# Patient Record
Sex: FEMALE | Race: WHITE | NOT HISPANIC OR LATINO | Employment: OTHER
[De-identification: names, ages, dates, MRNs, and addresses within clinical notes are randomized per-mention and may not be internally consistent; named-entity substitution may affect disease eponyms.]

---

## 2017-06-10 ENCOUNTER — HEALTH MAINTENANCE LETTER (OUTPATIENT)
Age: 62
End: 2017-06-10

## 2017-10-12 ENCOUNTER — OFFICE VISIT (OUTPATIENT)
Dept: FAMILY MEDICINE | Facility: CLINIC | Age: 62
End: 2017-10-12

## 2017-10-12 VITALS
SYSTOLIC BLOOD PRESSURE: 100 MMHG | OXYGEN SATURATION: 99 % | TEMPERATURE: 98.2 F | DIASTOLIC BLOOD PRESSURE: 63 MMHG | HEIGHT: 63 IN | WEIGHT: 123 LBS | BODY MASS INDEX: 21.79 KG/M2 | HEART RATE: 73 BPM | RESPIRATION RATE: 16 BRPM

## 2017-10-12 DIAGNOSIS — Z00.00 ROUTINE GENERAL MEDICAL EXAMINATION AT A HEALTH CARE FACILITY: Primary | ICD-10-CM

## 2017-10-12 DIAGNOSIS — Z13.9 SCREENING FOR CONDITION: ICD-10-CM

## 2017-10-12 NOTE — MR AVS SNAPSHOT
After Visit Summary   10/12/2017    Norma Sidhu    MRN: [id number]           Patient Information     Date Of Birth          1955        Visit Information        Provider Department      10/12/2017 2:00 PM Mariela Ji MD [facility]        Today's Diagnoses     Routine general medical examination at a health care facility    -  1    Screening for condition           Follow-ups after your visit        Follow-up notes from your care team     Return in about 1 year (around 10/12/2018).      Who to contact     Please call your clinic at [phone] to:    Ask questions about your health    Make or cancel appointments    Discuss your medicines    Learn about your test results    Speak to your doctor   If you have compliments or concerns about an experience at your clinic, or if you wish to file a complaint, please contact [facility] Patient Relations at [phone] or email us at [email or web address]         Additional Information About Your Visit        MyChart Information     Recargo is an electronic gateway that provides easy, online access to your medical records. With Recargo, you can request a clinic appointment, read your test results, renew a prescription or communicate with your care team.     To sign up for [employer] visit the website at [email or web address]   You will be asked to enter the access code listed below, as well as some personal information. Please follow the directions to create your username and password.     Your access code is: [email or web address]  Expires: 1/10/2018  3:28 PM     Your access code will  in 90 days. If you need help or a new code, please contact your [facility] or call [phone] for assistance.        Care EveryWhere ID     This is your Care EveryWhere ID. This could be used by other organizations to access your [facility] medical records  [id number]    "     Your Vitals Were     Pulse Temperature Respirations Height Pulse Oximetry BMI (Body Mass Index)    73 98.2  F (36.8  C) (Oral) 16 5' 3.19\" (160.5 cm) 99% 21.66 kg/m2       Blood Pressure from Last 3 Encounters:   10/12/17 100/63   06/03/16 96/63   05/02/16 99/62    Weight from Last 3 Encounters:   10/12/17 123 lb (55.8 kg)   06/03/16 139 lb 6.4 oz (63.2 kg)   05/02/16 136 lb 9.6 oz (62 kg)              We Performed the Following     Hepatitis C Antibody ([place])          Today's Medication Changes          These changes are accurate as of: 10/12/17  3:28 PM.  If you have any questions, ask your nurse or doctor.               Stop taking these medicines if you haven't already. Please contact your care team if you have questions.     HALDOL DECANOATE 50 MG/ML Soln injection   Generic drug:  haloperidol decanoate   Stopped by:  Mariela Ji MD                    Primary Care Provider Office Phone # Fax #    Mariela Ji [phone]       [facility] [address] [place] [address]        Equal Access to Services     NIKITA ELI AH: Hadii sandy ku hadasho Soomaali, waaxda luqadaha, qaybta kaalmada adeegyada, andrea deckerin haynicon beryl pino . So [facility] [phone].    ATENCIÓN: Si habla español, tiene a zhu disposición servicios gratuitos de asistencia lingüística. Llame al [phone].    We comply with applicable federal civil rights laws and [place] laws. We do not discriminate on the basis of race, color, national origin, age, disability, sex, sexual orientation, or gender identity.            Thank you!     Thank you for choosing [facility]  for your care. Our goal is always to provide you with excellent care. Hearing back from our patients is one way we can continue to improve our services. Please take a few minutes to complete the written survey that you may receive in the mail after your visit with us. Thank you!             Your Updated " Medication List - Protect others around you: Learn how to safely use, store and throw away your medicines at www.disposemymeds.org.          This list is accurate as of: 10/12/17  3:28 PM.  Always use your most recent med list.                   Brand Name Dispense Instructions for use Diagnosis    Ca Carb-FA-D-B6-B12-Boron-Mg 1342-1 MG Wafr    CALCIUM-FOLIC ACID PLUS D    30 Wafer    Take 1 tablet by mouth daily.    Delusional disorder(297.1)       calcium carb 1250 mg (500 mg [place])/vitamin D 200 units 500-200 MG-UNIT per tablet    OSCAL with D     Take 1 tablet by mouth        LORazepam 0.5 MG tablet    ATIVAN     Take 0.5 mg by mouth every 6 hours as needed    Schizotypal disorder, Schizoaffective disorder, unspecified type (H)       V-C FORTE Caps     30 capsule    Take 1 tablet by mouth daily.    Delusional disorder(297.1)

## 2017-10-12 NOTE — NURSING NOTE
DUE FOR:  Hepatitis C Screening  Colonoscopy/FIT Test  Advance Directive  Mammogram Referral  Pap  Flu   Patient declining all of the above and wants to talk about it with the provider
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
## 2018-05-21 ENCOUNTER — OFFICE VISIT (OUTPATIENT)
Dept: FAMILY MEDICINE | Facility: CLINIC | Age: 63
End: 2018-05-21
Payer: COMMERCIAL

## 2018-05-21 ENCOUNTER — RECORDS - HEALTHEAST (OUTPATIENT)
Dept: ADMINISTRATIVE | Facility: OTHER | Age: 63
End: 2018-05-21

## 2018-05-21 VITALS
HEART RATE: 91 BPM | BODY MASS INDEX: 20.25 KG/M2 | TEMPERATURE: 98.4 F | DIASTOLIC BLOOD PRESSURE: 68 MMHG | WEIGHT: 115 LBS | SYSTOLIC BLOOD PRESSURE: 103 MMHG | OXYGEN SATURATION: 99 %

## 2018-05-21 DIAGNOSIS — R14.1 FLATULENCE, ERUCTATION, AND GAS PAIN: ICD-10-CM

## 2018-05-21 DIAGNOSIS — Z12.39 SCREENING FOR BREAST CANCER: ICD-10-CM

## 2018-05-21 DIAGNOSIS — R14.2 FLATULENCE, ERUCTATION, AND GAS PAIN: ICD-10-CM

## 2018-05-21 DIAGNOSIS — R14.3 FLATULENCE, ERUCTATION, AND GAS PAIN: ICD-10-CM

## 2018-05-21 DIAGNOSIS — K58.2 IRRITABLE BOWEL SYNDROME WITH BOTH CONSTIPATION AND DIARRHEA: Primary | ICD-10-CM

## 2018-05-21 DIAGNOSIS — Z12.11 SCREEN FOR COLON CANCER: ICD-10-CM

## 2018-05-21 NOTE — PATIENT INSTRUCTIONS
Reduce dairy and avoid beans, fried, foods, high fat foods, until you are feeling better.     Fruits and vegetables are ok.   Lean meats are ok.   Small amounts of pasta ok.     Recommend a H Pylori and Colon Cancer FIT test.     =======================================================================  Your medication list is printed, please keep this with you, it is helpful to bring this current list to any other medical appointments, the emergency room or hospital.    If you had lab testing today and your results are reassuring or normal they will be be mailed to you within 7 days.     If the lab tests need quick action we will call you with the results.   The phone number we will call with results is # [phone] (home) . If this is not the best number please call our clinic and change the number.    If you need any refills please call your pharmacy and they will contact us.    If you have any further concerns or wish to schedule another appointment you must call our office during normal business hours  [phone] (8-5:00 M-F)  If you have urgent medical questions that cannot wait  you may also call [phone] at any time of day.  If you have a medical emergency please call [phone].    Thank you for coming to [facility].

## 2018-05-21 NOTE — PROGRESS NOTES
"Subjective: Norma is a 62-year-old female who I am meeting for the first time today.  She is an established patient of my partner Dr. Ji.  For the past 2 months she describes on-and-off bouts of abdominal gas.  She denies any pain now.  She recalls maybe she had some discomfort with gas at some point in the past two months.  Her chief concern is that she has more gas than usual, and it is embarrassing.  She also has times where she will have frequent loose stools and times with constipation.  She had 3 of constipation last week and 2 days of loose stools prompting her to schedule this visit.  She had a normal stool yesterday and has not had a stool today.  She has tried Mylanta once over the weekend which did not seem to make a difference.  She has had some similar symptoms in the past which she calls \"irritable bowel\".  She describes having a significant episode of \"gastritis\" when she was 18.  Denies any history of ulcers, bleeding, or inflammatory bowel disease.  She did some research online and is concerned she has H pylori induced gastritis.  She would like to be tested for H. Pylori.  She is also concerned that this could be due to stress.    Review of systems: No abdominal pain, no vomiting, no heartburn, no weight loss, no fevers or chills.  No blood in her stools or black stools. No vaginal bleeding. Post menopausal.  Frequently gets constipation when she travels to [place].    Social history: She is .  Describes her interactions with others as stressful.  \"People are mean\"  Frequently eats \"fruit smoothies\", tries to eat a healthy diet  Non-smoker    Exam  Vitals are as above with blood pressure goal  She weighs 115 pounds  General: Somewhat anxious appearing relates her own history.  Thin.  HEENT: Head is normocephalic and atraumatic eyes sclera nonicteric noninjected neck is free of adenopathy  Cardiovascular: Regular rate and rhythm  Respiratory: Clear bilaterally  Abdomen: Soft and " nontender throughout.  No masses.  No epigastric tenderness, no areas of tenderness, no abdominal distention  Extremities: Free of edema    Assessment and plan  1.  Abdominal gas, intermittent constipation and diarrhea: Differential diagnosis is broad at this point.  Exam is reassuring.  An irritable bowel syndrome seems most likely.  I did not identify acute psychological changes, but we did discuss that stress could certainly contribute.  Trial of a daily fiber supplement and avoiding foods which may increase gas and abdominal discomfort.  She was given instructions on the after visit summary.  It is reasonable to check stool H. pylori antigen which was ordered.   Also recommended colon cancer screening and she consented to fit test.  Deferred imaging,  exam,  or referral to endoscopy pending conservative observation and management over the coming days.  She will follow-up next Wednesday with her primary physician as scheduled

## 2018-05-21 NOTE — MR AVS SNAPSHOT
After Visit Summary   5/21/2018    Norma Sidhu    MRN: [id number]           Patient Information     Date Of Birth          1955        Visit Information        Provider Department      5/21/2018 11:00 AM Levy Madsen MD [facility]        Today's Diagnoses     Screening for breast cancer    -  1    Screen for colon cancer          Care Instructions    Reduce dairy and avoid beans, fried, foods, high fat foods, until you are feeling better.     Fruits and vegetables are ok.   Lean meats are ok.   Small amounts of pasta ok.     Recommend a H Pylori and Colon Cancer FIT test.     =======================================================================  Your medication list is printed, please keep this with you, it is helpful to bring this current list to any other medical appointments, the emergency room or hospital.    If you had lab testing today and your results are reassuring or normal they will be be mailed to you within 7 days.     If the lab tests need quick action we will call you with the results.   The phone number we will call with results is # [phone] (home) . If this is not the best number please call our clinic and change the number.    If you need any refills please call your pharmacy and they will contact us.    If you have any further concerns or wish to schedule another appointment you must call our office during normal business hours  [phone] (8-5:00 M-F)  If you have urgent medical questions that cannot wait  you may also call [phone] at any time of day.  If you have a medical emergency please call [phone].    Thank you for coming to [facility].              Follow-ups after your visit        Your next 10 appointments already scheduled     May 30, 2018  4:00 PM CDT   Return Visit with Mariela Ji MD   [facility] ([facility])    [address] [place] [address]   [phone]              Future  tests that were ordered for you today     Open Future Orders        Priority Expected Expires Ordered    Fecal Occult Blood - FIT, iFOB (P FM) Routine  2019            Who to contact     Please call your clinic at [phone] to:    Ask questions about your health    Make or cancel appointments    Discuss your medicines    Learn about your test results    Speak to your doctor            Additional Information About Your Visit        MyChart Information     Messagemind is an electronic gateway that provides easy, online access to your medical records. With Messagemind, you can request a clinic appointment, read your test results, renew a prescription or communicate with your care team.     To sign up for Messagemind visit the website at [email or web address]   You will be asked to enter the access code listed below, as well as some personal information. Please follow the directions to create your username and password.     Your access code is: [email or web address]  Expires: 2018 11:36 AM     Your access code will  in 90 days. If you need help or a new code, please contact your [facility] or call [phone] for assistance.        Care EveryWhere ID     This is your Care EveryWhere ID. This could be used by other organizations to access your [facility] medical records  [id number]        Your Vitals Were     Pulse Temperature Pulse Oximetry BMI (Body Mass Index)          91 98.4  F (36.9  C) (Oral) 99% 20.25 kg/m2         Blood Pressure from Last 3 Encounters:   18 103/68   10/12/17 100/63   16 96/63    Weight from Last 3 Encounters:   18 115 lb (52.2 kg)   10/12/17 123 lb (55.8 kg)   16 139 lb 6.4 oz (63.2 kg)              We Performed the Following     MA SCREENING DIGITAL BILAT        Primary Care Provider Office Phone # Fax #    Mariela Ji [phone]       [facility] [address]        Equal Access to  Services     [facility]: Hadii aad ku hadottojaun Priyankaabdulkadir, wajeanneda luqadaha, qaybta kaalmarosa martino, andrea pino . So [facility] [phone].    ATENCIÓN: Si habla erum, tiene a zhu disposición servicios gratuitos de asistencia lingüística. Llame al [phone].    We comply with applicable federal civil rights laws and [place] laws. We do not discriminate on the basis of race, color, national origin, age, disability, sex, sexual orientation, or gender identity.            Thank you!     Thank you for choosing [facility]  for your care. Our goal is always to provide you with excellent care. Hearing back from our patients is one way we can continue to improve our services. Please take a few minutes to complete the written survey that you may receive in the mail after your visit with us. Thank you!             Your Updated Medication List - Protect others around you: Learn how to safely use, store and throw away your medicines at www.disposemymeds.org.          This list is accurate as of 5/21/18 11:37 AM.  Always use your most recent med list.                   Brand Name Dispense Instructions for use Diagnosis    Ca Carb-FA-D-B6-B12-Boron-Mg 1342-1 MG Wafr    CALCIUM-FOLIC ACID PLUS D    30 Wafer    Take 1 tablet by mouth daily.    Delusional disorder(297.1)       Calcium carb-Vitamin D 500 mg [place]-200 units 500-200 MG-UNIT per tablet    OSCAL with D;Oyster Shell Calcium     Take 1 tablet by mouth        LORazepam 0.5 MG tablet    ATIVAN     Take 0.5 mg by mouth every 6 hours as needed    Schizotypal disorder, Schizoaffective disorder, unspecified type (H)       V-C FORTE Caps     30 capsule    Take 1 tablet by mouth daily.    Delusional disorder(297.1)

## 2018-09-21 ENCOUNTER — OFFICE VISIT (OUTPATIENT)
Dept: FAMILY MEDICINE | Facility: CLINIC | Age: 63
End: 2018-09-21
Payer: COMMERCIAL

## 2018-09-21 VITALS
TEMPERATURE: 97.9 F | RESPIRATION RATE: 18 BRPM | HEIGHT: 63 IN | DIASTOLIC BLOOD PRESSURE: 70 MMHG | SYSTOLIC BLOOD PRESSURE: 103 MMHG | BODY MASS INDEX: 19.31 KG/M2 | WEIGHT: 109 LBS | HEART RATE: 95 BPM

## 2018-09-21 DIAGNOSIS — T74.91XS DOMESTIC VIOLENCE OF ADULT, SEQUELA: Primary | ICD-10-CM

## 2018-09-21 DIAGNOSIS — F20.3 UNDIFFERENTIATED SCHIZOPHRENIA (H): ICD-10-CM

## 2018-09-21 DIAGNOSIS — R14.1 FLATULENCE, ERUCTATION, AND GAS PAIN: ICD-10-CM

## 2018-09-21 DIAGNOSIS — R14.3 FLATULENCE, ERUCTATION, AND GAS PAIN: ICD-10-CM

## 2018-09-21 DIAGNOSIS — R14.2 FLATULENCE, ERUCTATION, AND GAS PAIN: ICD-10-CM

## 2018-09-21 PROBLEM — T74.91XA DOMESTIC ABUSE OF ADULT: Status: ACTIVE | Noted: 2018-09-21
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
## 2020-04-11 ENCOUNTER — MEDICAL CORRESPONDENCE (OUTPATIENT)
Dept: HEALTH INFORMATION MANAGEMENT | Facility: CLINIC | Age: 65
End: 2020-04-11

## 2020-10-14 ENCOUNTER — TELEPHONE (OUTPATIENT)
Dept: FAMILY MEDICINE | Facility: CLINIC | Age: 65
End: 2020-10-14

## 2020-10-14 NOTE — TELEPHONE ENCOUNTER
Called patient to discuss. Left detailed VM on identified VM advising will need to see an alternate provider r/t needing to be face-to-face visit. Offered to see a different faculty provider or one of the physicians on 's colored team. Requested call-back to schedule. Lydia MALHOTRA

## 2020-10-14 NOTE — TELEPHONE ENCOUNTER
[facility] phone call message- general phone call:    Reason for call: Patient called wanting an appointment with Dr. Ji or Dr. Anthony for a Biopsy on a possible melanoma on her nose. Patient is to have plastic surgery in 2 weeks and the surgeon would like for the nose to be looked at first if possible. Advised to patient Dr. Ji has no opening until November and Dr. Anthony only works from home. Patient declined visit with other providers at this time and request for a nurse to return her call. Please call and advise.     Return call needed: Yes    OK to leave a message on voice mail? Yes    Primary language: English      needed? No    Call taken on October 14, 2020 at 9:42 AM by Catarino Strong

## 2020-11-20 ENCOUNTER — TELEPHONE (OUTPATIENT)
Dept: DERMATOLOGY | Facility: CLINIC | Age: 65
End: 2020-11-20

## 2020-11-20 NOTE — TELEPHONE ENCOUNTER
Reason for call:  Other   Patient called regarding (reason for call): appointment  Additional comments: Please call pt to schedule appointment    Phone number to reach patient:  Home number on file [phone] (home)    Best Time:  any    Can we leave a detailed message on this number?  YES    Travel screening: Not Applicable

## 2020-11-25 ENCOUNTER — HOSPITAL ENCOUNTER (OUTPATIENT)
Dept: MAMMOGRAPHY | Facility: CLINIC | Age: 65
Discharge: HOME | End: 2020-11-25
Attending: FAMILY MEDICINE

## 2020-11-25 DIAGNOSIS — Z12.31 VISIT FOR SCREENING MAMMOGRAM: ICD-10-CM

## 2020-11-30 ENCOUNTER — TELEPHONE (OUTPATIENT)
Dept: DERMATOLOGY | Facility: CLINIC | Age: 65
End: 2020-11-30

## 2020-11-30 ENCOUNTER — TRANSFERRED RECORDS (OUTPATIENT)
Dept: MULTI SPECIALTY CLINIC | Facility: CLINIC | Age: 65
End: 2020-11-30

## 2020-11-30 ENCOUNTER — OFFICE VISIT (OUTPATIENT)
Dept: DERMATOLOGY | Facility: CLINIC | Age: 65
End: 2020-11-30
Payer: MEDICARE

## 2020-11-30 VITALS — RESPIRATION RATE: 16 BRPM | SYSTOLIC BLOOD PRESSURE: 99 MMHG | HEART RATE: 79 BPM | DIASTOLIC BLOOD PRESSURE: 66 MMHG

## 2020-11-30 DIAGNOSIS — L57.0 AK (ACTINIC KERATOSIS): Primary | ICD-10-CM

## 2020-11-30 LAB — PAP SMEAR - HIM PATIENT REPORTED: NORMAL

## 2020-11-30 PROCEDURE — 99203 OFFICE O/P NEW LOW 30 MIN: CPT | Mod: 25 | Performed by: DERMATOLOGY

## 2020-11-30 PROCEDURE — 11102 TANGNTL BX SKIN SINGLE LES: CPT | Performed by: DERMATOLOGY

## 2020-11-30 PROCEDURE — 88331 PATH CONSLTJ SURG 1 BLK 1SPC: CPT | Performed by: DERMATOLOGY

## 2020-11-30 NOTE — TELEPHONE ENCOUNTER
----- Message from Michael Parisi MD sent at 11/30/2020 11:09 AM CST -----  Nasal tip actinic keratosis     I would allow things to heal and return to clinic in 3 months for recheck

## 2020-11-30 NOTE — NURSING NOTE
"Initial BP 99/66 (BP Location: Left arm, Patient Position: Sitting, Cuff Size: Adult Regular)   Pulse 79   Resp 16  Estimated body mass index is 19.31 kg/m  as calculated from the following:    Height as of 9/21/18: 1.6 m (5' 2.99\").    Weight as of 9/21/18: 49.4 kg (109 lb). .    Candy Ramírez [facility]    "

## 2020-11-30 NOTE — PROGRESS NOTES
Norma Sidhu is an extremely pleasant 65 year old year old female patient here today for spot on nose.   .  Patient states this has been present for sometime.  Patient reports the following symptoms:  crusting.  Patient reports the following previous treatments was given efudex and had some bleeding.  .  These treatments did not work.  She used for one week.  Stopped about 3 days ago.  Patient has no other skin complaints today.  Remainder of the HPI, Meds, PMH, Allergies, FH, and SH was reviewed in chart.      Past Medical History:   Diagnosis Date     Difficulty with family 1/30/2013     Problem list name updated by automated process. Provider to review     Hypotension      Insomnia     hx of     Postmenopausal atrophic vaginitis 1/30/2013     Psychosis (H) 2/27/2015     Undifferentiated schizophrenia (H) 7/20/2015       Past Surgical History:   Procedure Laterality Date     CL AFF SURGICAL PATHOLOGY  2000    melanoma resection left leg     hospitalzation  7-19 thru 9-2-15    [facility]        Family History   Problem Relation Age of Onset     Alzheimer Disease Mother      Hyperlipidemia Father      Coronary Artery Disease Paternal Grandfather         in his 60s     Diabetes No family hx of      Cancer - colorectal No family hx of      Breast Cancer No family hx of      Hypertension No family hx of      Cerebrovascular Disease No family hx of      Colon Cancer No family hx of      Prostate Cancer No family hx of      Other Cancer No family hx of      Depression No family hx of      Anxiety Disorder No family hx of      Mental Illness No family hx of      Substance Abuse No family hx of      Anesthesia Reaction No family hx of      Asthma No family hx of      Osteoporosis No family hx of      Genetic Disorder No family hx of      Thyroid Disease No family hx of      Obesity No family hx of        Social History     Socioeconomic History     Marital status:      Spouse name:       Number of children: 2     Years of education: Not on file     Highest education level: Not on file   Occupational History     Occupation: Retired     Occupation: Homemaker     Occupation: Unemployed   Social Needs     Financial resource strain: Not on file     Food insecurity     Worry: Not on file     Inability: Not on file     Transportation needs     Medical: Not on file     Non-medical: Not on file   Tobacco Use     Smoking status: Never Smoker     Smokeless tobacco: Never Used   Substance and Sexual Activity     Alcohol use: No     Alcohol/week: 0.0 standard drinks     Drug use: No     Sexual activity: Yes     Partners: Male   Lifestyle     Physical activity     Days per week: Not on file     Minutes per session: Not on file     Stress: Not on file   Relationships     Social connections     Talks on phone: Not on file     Gets together: Not on file     Attends Advent service: Not on file     Active member of club or organization: Not on file     Attends meetings of clubs or organizations: Not on file     Relationship status: Not on file     Intimate partner violence     Fear of current or ex partner: Not on file     Emotionally abused: Not on file     Physically abused: Not on file     Forced sexual activity: Not on file   Other Topics Concern     Parent/sibling w/ CABG, MI or angioplasty before 65F 55M? Not Asked   Social History Narrative    Lives with .  Both with psychiatric conditions        Children: Ming Taylor (adults)       Outpatient Encounter Medications as of 11/30/2020   Medication Sig Dispense Refill     calcium carb 1250 mg, 500 mg [place],/vitamin D 200 units (CALCIUM CARB 1250 MG, 500 MG [place],/VITAMIN D 200 UNIT) 500-200 MG-UNIT per tablet Take 1 tablet by mouth       LORazepam (ATIVAN) 0.5 MG tablet Take 0.5 mg by mouth every 6 hours as needed       Multiple Vitamins-Minerals (V-C FORTE) CAPS Take 1 tablet by mouth daily. 30 capsule 2     Ca Carb-FA-D-B6-B12-Boron-Mg  (CALCIUM-FOLIC ACID PLUS D) 1342-1 MG WAFR Take 1 tablet by mouth daily. (Patient not taking: Reported on 5/21/2018) 30 Wafer 2     No facility-administered encounter medications on file as of 11/30/2020.              Review Of Systems  Skin: As above  Eyes: negative  Ears/Nose/Throat: negative  Respiratory: No shortness of breath, dyspnea on exertion, cough, or hemoptysis  Cardiovascular: negative  Gastrointestinal: negative  Genitourinary: negative  Musculoskeletal: negative  Neurologic: negative  Psychiatric: negative  Hematologic/Lymphatic/Immunologic: negative  Endocrine: negative      O:   NAD, WDWN, Alert & Oriented, Mood & Affect wnl, Vitals stable   Here today alone   BP 99/66 (BP Location: Left arm, Patient Position: Sitting, Cuff Size: Adult Regular)   Pulse 79   Resp 16    General appearance normal   Vitals stable   Alert, oriented and in no acute distress     L nasal supra tip crusted 5mm scaly papule       Eyes: Conjunctivae/lids:Normal     ENT: Lips, buccal mucosa, tongue: normal    MSK:Normal    Cardiovascular: peripheral edema none    Pulm: Breathing Normal    Neuro/Psych: Orientation:Alert and Orientedx3 ; Mood/Affect:normal       MICRO:   L nasal suprtip:There is hyperkeratosis and focal parakeratosis of the epidermis, and ucleration of epidermis, overlying atypical keratinocytes,  by areas of orthokeratosis, there are scattered basal atypical keratinocytes: with varying degrees of overlying loss of maturation, hyperchromatism, pleomorphism, increased and abnormal mitoses, dyskeratosis.  The dermis shows a variable inflammatory infiltrate.   A/P:  1. Nasal tip r/o squamous cell carcinoma   TANGENTIAL BIOPSY IN HOUSE:  After consent, anesthesia with LEC and prep, tangential excision performed and dx above confirmed with frozen section histology.  No complications and routine wound care.      I have personally reviewed all specimens and/or slides and used them with my medical judgement to  determine or confirm the final diagnosis.     Patient told result actinic keratosis    I would wait for things to heal and return to clinic in 3month for recheck     It was a pleasure speaking to Norma Sidhu today.  UV precautions reviewed with patient.

## 2020-11-30 NOTE — LETTER
11/30/2020         RE: Norma Sidhu  [address] [facility] [address]        Dear Colleague,    Thank you for referring your patient, Norma Sidhu, to the [facility]. Please see a copy of my visit note below.    Norma Sidhu is an extremely pleasant 65 year old year old female patient here today for spot on nose.   .  Patient states this has been present for sometime.  Patient reports the following symptoms:  crusting.  Patient reports the following previous treatments was given efudex and had some bleeding.  .  These treatments did not work.  She used for one week.  Stopped about 3 days ago.  Patient has no other skin complaints today.  Remainder of the HPI, Meds, PMH, Allergies, FH, and SH was reviewed in chart.      Past Medical History:   Diagnosis Date     Difficulty with family 1/30/2013     Problem list name updated by automated process. Provider to review     Hypotension      Insomnia     hx of     Postmenopausal atrophic vaginitis 1/30/2013     Psychosis (H) 2/27/2015     Undifferentiated schizophrenia (H) 7/20/2015       Past Surgical History:   Procedure Laterality Date     CL AFF SURGICAL PATHOLOGY  2000    melanoma resection left leg     hospitalzation  7-19 thru 9-2-15    [facility]        Family History   Problem Relation Age of Onset     Alzheimer Disease Mother      Hyperlipidemia Father      Coronary Artery Disease Paternal Grandfather         in his 60s     Diabetes No family hx of      Cancer - colorectal No family hx of      Breast Cancer No family hx of      Hypertension No family hx of      Cerebrovascular Disease No family hx of      Colon Cancer No family hx of      Prostate Cancer No family hx of      Other Cancer No family hx of      Depression No family hx of      Anxiety Disorder No family hx of      Mental Illness No family hx of      Substance Abuse No family hx of      Anesthesia Reaction No  family hx of      Asthma No family hx of      Osteoporosis No family hx of      Genetic Disorder No family hx of      Thyroid Disease No family hx of      Obesity No family hx of        Social History     Socioeconomic History     Marital status:      Spouse name:      Number of children: 2     Years of education: Not on file     Highest education level: Not on file   Occupational History     Occupation: Retired     Occupation: Homemaker     Occupation: Unemployed   Social Needs     Financial resource strain: Not on file     Food insecurity     Worry: Not on file     Inability: Not on file     Transportation needs     Medical: Not on file     Non-medical: Not on file   Tobacco Use     Smoking status: Never Smoker     Smokeless tobacco: Never Used   Substance and Sexual Activity     Alcohol use: No     Alcohol/week: 0.0 standard drinks     Drug use: No     Sexual activity: Yes     Partners: Male   Lifestyle     Physical activity     Days per week: Not on file     Minutes per session: Not on file     Stress: Not on file   Relationships     Social connections     Talks on phone: Not on file     Gets together: Not on file     Attends Rastafarian service: Not on file     Active member of club or organization: Not on file     Attends meetings of clubs or organizations: Not on file     Relationship status: Not on file     Intimate partner violence     Fear of current or ex partner: Not on file     Emotionally abused: Not on file     Physically abused: Not on file     Forced sexual activity: Not on file   Other Topics Concern     Parent/sibling w/ CABG, MI or angioplasty before 65F 55M? Not Asked   Social History Narrative    Lives with .  Both with psychiatric conditions        Children: Ming Taylor (adults)       Outpatient Encounter Medications as of 11/30/2020   Medication Sig Dispense Refill     calcium carb 1250 mg, 500 mg [place],/vitamin D 200 units (CALCIUM CARB 1250 MG, 500 MG [place],/VITAMIN D  200 UNIT) 500-200 MG-UNIT per tablet Take 1 tablet by mouth       LORazepam (ATIVAN) 0.5 MG tablet Take 0.5 mg by mouth every 6 hours as needed       Multiple Vitamins-Minerals (V-C FORTE) CAPS Take 1 tablet by mouth daily. 30 capsule 2     Ca Carb-FA-D-B6-B12-Boron-Mg (CALCIUM-FOLIC ACID PLUS D) 1342-1 MG WAFR Take 1 tablet by mouth daily. (Patient not taking: Reported on 5/21/2018) 30 Wafer 2     No facility-administered encounter medications on file as of 11/30/2020.              Review Of Systems  Skin: As above  Eyes: negative  Ears/Nose/Throat: negative  Respiratory: No shortness of breath, dyspnea on exertion, cough, or hemoptysis  Cardiovascular: negative  Gastrointestinal: negative  Genitourinary: negative  Musculoskeletal: negative  Neurologic: negative  Psychiatric: negative  Hematologic/Lymphatic/Immunologic: negative  Endocrine: negative      O:   NAD, WDWN, Alert & Oriented, Mood & Affect wnl, Vitals stable   Here today alone   BP 99/66 (BP Location: Left arm, Patient Position: Sitting, Cuff Size: Adult Regular)   Pulse 79   Resp 16    General appearance normal   Vitals stable   Alert, oriented and in no acute distress     L nasal supra tip crusted 5mm scaly papule       Eyes: Conjunctivae/lids:Normal     ENT: Lips, buccal mucosa, tongue: normal    MSK:Normal    Cardiovascular: peripheral edema none    Pulm: Breathing Normal    Neuro/Psych: Orientation:Alert and Orientedx3 ; Mood/Affect:normal       MICRO:   L nasal suprtip:There is hyperkeratosis and focal parakeratosis of the epidermis, and ucleration of epidermis, overlying atypical keratinocytes,  by areas of orthokeratosis, there are scattered basal atypical keratinocytes: with varying degrees of overlying loss of maturation, hyperchromatism, pleomorphism, increased and abnormal mitoses, dyskeratosis.  The dermis shows a variable inflammatory infiltrate.   A/P:  1. Nasal tip r/o squamous cell carcinoma   TANGENTIAL BIOPSY IN HOUSE:   After consent, anesthesia with LEC and prep, tangential excision performed and dx above confirmed with frozen section histology.  No complications and routine wound care.      I have personally reviewed all specimens and/or slides and used them with my medical judgement to determine or confirm the final diagnosis.     Patient told result actinic keratosis    I would wait for things to heal and return to clinic in 3month for recheck     It was a pleasure speaking to Norma Sidhu today.  UV precautions reviewed with patient.        Again, thank you for allowing me to participate in the care of your patient.        Sincerely,        Michael Parisi MD

## 2020-12-01 NOTE — TELEPHONE ENCOUNTER
Patient notified. Patient verbalized understanding. Transferred to schedule 3 month follow up appt. Gisela Noel RN

## 2021-01-06 ENCOUNTER — TELEPHONE (OUTPATIENT)
Dept: OBGYN | Facility: CLINIC | Age: 66
End: 2021-01-06

## 2021-05-28 ENCOUNTER — RECORDS - HEALTHEAST (OUTPATIENT)
Dept: ADMINISTRATIVE | Facility: CLINIC | Age: 66
End: 2021-05-28

## 2021-05-30 ENCOUNTER — RECORDS - HEALTHEAST (OUTPATIENT)
Dept: ADMINISTRATIVE | Facility: CLINIC | Age: 66
End: 2021-05-30

## 2021-05-31 ENCOUNTER — RECORDS - HEALTHEAST (OUTPATIENT)
Dept: ADMINISTRATIVE | Facility: CLINIC | Age: 66
End: 2021-05-31

## 2021-06-30 ENCOUNTER — TELEPHONE (OUTPATIENT)
Dept: FAMILY MEDICINE | Facility: CLINIC | Age: 66
End: 2021-06-30

## 2021-06-30 PROBLEM — G47.00 INSOMNIA: Status: ACTIVE | Noted: 2021-06-30

## 2021-06-30 PROBLEM — C43.70 MALIGNANT MELANOMA OF SKIN OF LOWER LIMB, INCLUDING HIP (H): Status: ACTIVE | Noted: 2021-06-30

## 2021-06-30 NOTE — TELEPHONE ENCOUNTER
[facility] phone call message- patient requesting results:    Test: Lab and Mammogram    Date of test: Unsure    Additional Comments: Patient states she had a mammogram sometime last year and never got her results. Patient wants to know what her results are. She states she also has some questions about the covid19 vaccine. Please call and advise.     OK to leave a message on voice mail? Yes    Primary language: English      needed? No    Call taken on June 30, 2021 at 9:53 AM by Susana Cherry
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
## 2021-07-08 ENCOUNTER — TRANSFERRED RECORDS (OUTPATIENT)
Dept: HEALTH INFORMATION MANAGEMENT | Facility: CLINIC | Age: 66
End: 2021-07-08

## 2021-07-08 LAB
HPV ABSTRACT: NORMAL
PAP-ABSTRACT: NORMAL

## 2021-07-13 ENCOUNTER — RECORDS - HEALTHEAST (OUTPATIENT)
Dept: ADMINISTRATIVE | Facility: CLINIC | Age: 66
End: 2021-07-13

## 2021-07-21 ENCOUNTER — RECORDS - HEALTHEAST (OUTPATIENT)
Dept: ADMINISTRATIVE | Facility: CLINIC | Age: 66
End: 2021-07-21

## 2021-07-21 ENCOUNTER — TELEPHONE (OUTPATIENT)
Dept: FAMILY MEDICINE | Facility: CLINIC | Age: 66
End: 2021-07-21

## 2021-07-21 NOTE — TELEPHONE ENCOUNTER
Sores in her genital area. Recently got tested for AIDS at [facility] and tested negative and also got a papsmear for HPV and tested positive. Patient wants to know why she tested negative for one and positive for the other.  Please call back and advise.

## 2021-07-21 NOTE — TELEPHONE ENCOUNTER
Called Norma to further discuss. Provided education on the difference between HPV and HIV. Unable to see results at this time, provided general education on cinthya HPV, sores from HPV and using protection during intercourse. Norma asked if we can tell when she contracted HPV, advised unable to determine when. Advised to speak with OBGYN for other questions at her upcoming appointment due to unable to see results. Norma verbalized understanding.

## 2021-07-24 ENCOUNTER — HOSPITAL ENCOUNTER (EMERGENCY)
Facility: CLINIC | Age: 66
Discharge: HOME | End: 2021-07-24
Attending: PHYSICIAN ASSISTANT | Admitting: PHYSICIAN ASSISTANT
Payer: MEDICARE

## 2021-07-24 VITALS
HEART RATE: 64 BPM | OXYGEN SATURATION: 98 % | RESPIRATION RATE: 14 BRPM | DIASTOLIC BLOOD PRESSURE: 68 MMHG | BODY MASS INDEX: 19.49 KG/M2 | SYSTOLIC BLOOD PRESSURE: 100 MMHG | WEIGHT: 110 LBS | TEMPERATURE: 98 F

## 2021-07-24 DIAGNOSIS — B96.89 BACTERIAL VAGINOSIS: ICD-10-CM

## 2021-07-24 DIAGNOSIS — A60.00 GENITAL HERPES: ICD-10-CM

## 2021-07-24 DIAGNOSIS — N76.0 BACTERIAL VAGINOSIS: ICD-10-CM

## 2021-07-24 DIAGNOSIS — Z59.00 HOMELESSNESS: ICD-10-CM

## 2021-07-24 DIAGNOSIS — N90.89 LESION OF VULVA: ICD-10-CM

## 2021-07-24 LAB
CLUE CELLS: PRESENT
TRICHOMONAS, WET PREP: ABNORMAL
WBC'S/HIGH POWER FIELD, WET PREP: ABNORMAL
YEAST, WET PREP: ABNORMAL

## 2021-07-24 PROCEDURE — 99214 OFFICE O/P EST MOD 30 MIN: CPT | Performed by: PHYSICIAN ASSISTANT

## 2021-07-24 PROCEDURE — 87529 HSV DNA AMP PROBE: CPT | Mod: 91

## 2021-07-24 PROCEDURE — 87529 HSV DNA AMP PROBE: CPT | Performed by: PHYSICIAN ASSISTANT

## 2021-07-24 PROCEDURE — G0463 HOSPITAL OUTPT CLINIC VISIT: HCPCS | Performed by: PHYSICIAN ASSISTANT

## 2021-07-24 PROCEDURE — 87210 SMEAR WET MOUNT SALINE/INK: CPT | Performed by: PHYSICIAN ASSISTANT

## 2021-07-24 RX ORDER — VALACYCLOVIR HYDROCHLORIDE 1 G/1
1000 TABLET, FILM COATED ORAL 2 TIMES DAILY
Qty: 20 TABLET | Refills: 0 | Status: SHIPPED | OUTPATIENT
Start: 2021-07-24 | End: 2021-11-27

## 2021-07-24 RX ORDER — METRONIDAZOLE 500 MG/1
500 TABLET ORAL 2 TIMES DAILY
Qty: 14 TABLET | Refills: 0 | Status: SHIPPED | OUTPATIENT
Start: 2021-07-24 | End: 2021-07-31

## 2021-07-24 SDOH — ECONOMIC STABILITY - HOUSING INSECURITY: HOMELESSNESS UNSPECIFIED: Z59.00

## 2021-07-24 ASSESSMENT — ENCOUNTER SYMPTOMS
HEMATURIA: 0
FLANK PAIN: 0
PSYCHIATRIC NEGATIVE: 1
FREQUENCY: 0
CARDIOVASCULAR NEGATIVE: 1
NEUROLOGICAL NEGATIVE: 1
EYES NEGATIVE: 1
DIFFICULTY URINATING: 0
MUSCULOSKELETAL NEGATIVE: 1
CONSTITUTIONAL NEGATIVE: 1
DYSURIA: 0
RESPIRATORY NEGATIVE: 1
GASTROINTESTINAL NEGATIVE: 1

## 2021-07-24 NOTE — DISCHARGE INSTRUCTIONS
Use medication as directed.   Valtrex for herpes simplex outbreak and flagyl for bacterial vaginosis. No alcohol while taking flagyl it will cause extreme vomiting.     Keep your appointment with your primary care provider for further evaluation and management of sores and for full physical.     We called the [place] women[facility] for you today and they are open until 4:30pm. We gave them your phone number also. Please also see attached information for other women's [facility] and resources.     Return to the Emergency Room if worsening/change in symptoms, fevers, concern for your safety, thoughts of hurting yourself or others.
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
## 2021-07-25 ENCOUNTER — TELEPHONE (OUTPATIENT)
Dept: EMERGENCY MEDICINE | Facility: CLINIC | Age: 66
End: 2021-07-25

## 2021-07-25 LAB
HSV1 DNA SPEC QL NAA+PROBE: NOT DETECTED
HSV2 DNA SPEC QL NAA+PROBE: DETECTED

## 2021-07-25 NOTE — RESULT ENCOUNTER NOTE
At 11A Left voicemail message requesting a call back to [facility] ED Lab Result RN at [phone].  RN is available every day between 9 a.m. and 5:30 p.m.  See telephone encounter
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
## 2021-07-25 NOTE — RESULT ENCOUNTER NOTE
Final result for Herpes Simplex Virus 1 PCR is [NEGATIVE] and Herpes Simplex Virus 2 PCR is [POSITIVE].    [facility] Emergency Dept discharge antiviral medication (if prescribed): Valacyclovir (VALTREX) 1000 mg tablet, 1 tablet (1,000 mg) by mouth 2 times daily for 10 days  Recommendations in Treatment per [facility] ED Lab Result Herpes Simplex Virus Protocol

## 2021-07-25 NOTE — TELEPHONE ENCOUNTER
[facility] result notification     Patient/parent Name  Norma    Lab result (if applicable):  Final result for Herpes Simplex Virus 1 PCR is [NEGATIVE] and Herpes Simplex Virus 2 PCR is [POSITIVE].    [facility] Emergency Dept discharge antiviral medication (if prescribed): Valacyclovir (VALTREX) 1000 mg tablet, 1 tablet (1,000 mg) by mouth 2 times daily for 10 days  Recommendations in Treatment per [facility] ED Lab Result Herpes Simplex Virus Protocol    RN Recommendations/Instructions per [place] ED lab result protocol  Patient notified of lab result and treatment recommendations.  Encouraged to take the Valtrex as prescribed.  She has appt on Wednesday.     Please Contact your PCP clinic or return to the Emergency department if your:    Symptoms worsen or other concerning symptom's.    PCP follow-up Questions asked: YES       Cristofer Farrell RN  [facility]  Emergency Dept Lab Result RN  # [phone]

## 2021-07-27 ENCOUNTER — TELEPHONE (OUTPATIENT)
Dept: FAMILY MEDICINE | Facility: CLINIC | Age: 66
End: 2021-07-27
Payer: MEDICARE

## 2021-07-27 DIAGNOSIS — Z53.9 ERRONEOUS ENCOUNTER--DISREGARD: Primary | ICD-10-CM

## 2021-07-28 PROBLEM — B00.9 HERPES SIMPLEX VIRUS INFECTION: Status: ACTIVE | Noted: 2021-07-28

## 2021-09-09 ENCOUNTER — TRANSFERRED RECORDS (OUTPATIENT)
Dept: HEALTH INFORMATION MANAGEMENT | Facility: CLINIC | Age: 66
End: 2021-09-09

## 2021-09-09 LAB
C TRACH DNA SPEC QL PROBE+SIG AMP: NEGATIVE
N GONORRHOEA DNA SPEC QL PROBE+SIG AMP: NEGATIVE
SPECIMEN DESCRIP: NORMAL
SPECIMEN DESCRIPTION: NORMAL

## 2021-09-14 ENCOUNTER — MEDICAL CORRESPONDENCE (OUTPATIENT)
Dept: HEALTH INFORMATION MANAGEMENT | Facility: CLINIC | Age: 66
End: 2021-09-14

## 2021-09-14 ENCOUNTER — TELEPHONE (OUTPATIENT)
Dept: INFECTIOUS DISEASES | Facility: CLINIC | Age: 66
End: 2021-09-14

## 2021-09-14 NOTE — TELEPHONE ENCOUNTER
I believe Dr Duncan has an opening tomorrow. Please schedule the pt in person if she can come tomorrow.

## 2021-09-14 NOTE — TELEPHONE ENCOUNTER
Patient wanted a female provider. Appt made as so:   Date: 9/28/2021   Time: 1:40 PM   Visit Type: NEW [656]   Provider: Julia Perez MD   Bill Area: Infectious Disease [facility]     States that she does not believe she has HIV and would like us to prepare everything to say that she is. She is already on ABX and doesn't understand why she needs to be on ABX for so long, etc.

## 2021-09-14 NOTE — TELEPHONE ENCOUNTER
Records received  9/14/2021 9:23am inside ID Consult fax.    [facility] - [phone]  Referring: Dr Bria Early  DX: HIV + test      Please review and advise on scheduling, as 1st available is: 9/28/21

## 2021-09-18 ENCOUNTER — HOSPITAL ENCOUNTER (EMERGENCY)
Facility: CLINIC | Age: 66
Discharge: HOME | End: 2021-09-18
Attending: FAMILY MEDICINE | Admitting: FAMILY MEDICINE
Payer: MEDICARE

## 2021-09-18 VITALS
TEMPERATURE: 98 F | RESPIRATION RATE: 20 BRPM | HEIGHT: 63 IN | SYSTOLIC BLOOD PRESSURE: 107 MMHG | BODY MASS INDEX: 19.67 KG/M2 | HEART RATE: 111 BPM | DIASTOLIC BLOOD PRESSURE: 76 MMHG | WEIGHT: 111 LBS | OXYGEN SATURATION: 98 %

## 2021-09-18 DIAGNOSIS — B37.31 YEAST VAGINITIS: ICD-10-CM

## 2021-09-18 LAB
ALBUMIN UR-MCNC: NEGATIVE MG/DL
APPEARANCE UR: ABNORMAL
BILIRUB UR QL STRIP: NEGATIVE
CLUE CELLS: PRESENT
COLOR UR AUTO: YELLOW
GLUCOSE UR STRIP-MCNC: NEGATIVE MG/DL
HGB UR QL STRIP: NEGATIVE
KETONES UR STRIP-MCNC: NEGATIVE MG/DL
LEUKOCYTE ESTERASE UR QL STRIP: ABNORMAL
NITRATE UR QL: NEGATIVE
PH UR STRIP: 8 [PH] (ref 5–7)
RBC URINE: 1 /HPF
SP GR UR STRIP: 1.01 (ref 1–1.03)
SQUAMOUS EPITHELIAL: 3 /HPF
TRICHOMONAS, WET PREP: ABNORMAL
UROBILINOGEN UR STRIP-MCNC: NORMAL MG/DL
WBC URINE: 5 /HPF
WBC'S/HIGH POWER FIELD, WET PREP: ABNORMAL
YEAST, WET PREP: PRESENT

## 2021-09-18 PROCEDURE — 86701 HIV-1ANTIBODY: CPT | Mod: 59 | Performed by: FAMILY MEDICINE

## 2021-09-18 PROCEDURE — 81001 URINALYSIS AUTO W/SCOPE: CPT | Performed by: FAMILY MEDICINE

## 2021-09-18 PROCEDURE — 36415 COLL VENOUS BLD VENIPUNCTURE: CPT | Performed by: FAMILY MEDICINE

## 2021-09-18 PROCEDURE — 87389 HIV-1 AG W/HIV-1&-2 AB AG IA: CPT | Performed by: FAMILY MEDICINE

## 2021-09-18 PROCEDURE — 87086 URINE CULTURE/COLONY COUNT: CPT | Performed by: FAMILY MEDICINE

## 2021-09-18 PROCEDURE — G0463 HOSPITAL OUTPT CLINIC VISIT: HCPCS | Performed by: FAMILY MEDICINE

## 2021-09-18 PROCEDURE — 99214 OFFICE O/P EST MOD 30 MIN: CPT | Performed by: FAMILY MEDICINE

## 2021-09-18 PROCEDURE — 87210 SMEAR WET MOUNT SALINE/INK: CPT | Performed by: FAMILY MEDICINE

## 2021-09-18 RX ORDER — FLUCONAZOLE 150 MG/1
TABLET ORAL
Qty: 2 TABLET | Refills: 0 | Status: SHIPPED | OUTPATIENT
Start: 2021-09-18 | End: 2021-09-21

## 2021-09-18 ASSESSMENT — ENCOUNTER SYMPTOMS
HEMATOLOGIC/LYMPHATIC NEGATIVE: 1
EYES NEGATIVE: 1
NEUROLOGICAL NEGATIVE: 1
PSYCHIATRIC NEGATIVE: 1
ALLERGIC/IMMUNOLOGIC NEGATIVE: 1
MUSCULOSKELETAL NEGATIVE: 1
DYSURIA: 1
ENDOCRINE NEGATIVE: 1
FATIGUE: 1
CARDIOVASCULAR NEGATIVE: 1
RESPIRATORY NEGATIVE: 1
FREQUENCY: 1
GASTROINTESTINAL NEGATIVE: 1

## 2021-09-18 ASSESSMENT — MIFFLIN-ST. JEOR: SCORE: 1022.62
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
## 2021-09-19 ENCOUNTER — TELEPHONE (OUTPATIENT)
Dept: NURSING | Facility: CLINIC | Age: 66
End: 2021-09-19

## 2021-09-19 LAB — BACTERIA UR CULT: NORMAL

## 2021-09-19 NOTE — TELEPHONE ENCOUNTER
HIV test is still in process all other labs ( wet prep and UA)  were discussed with her at the visit

## 2021-09-19 NOTE — TELEPHONE ENCOUNTER
"Data:  Pt wanting to know if the lab results have come back that was performed yesterday. Specifically pt is looking for their HIV test results.        Action:   Most of the tests have resulted but no MD has addressed the abnormals yet so did not relay results to pt. HIV test is still pending and this was advised to the pt. Will route message to ordering MD to follow up with pt on results.        Response:   Pt verbalizes understanding and agrees with plan of care.    Riley Jameson RN 9/19/2021 12:21 PM  [facility] Nurse Advisor    COVID 19 Nurse Triage Plan/Patient Instructions    Please be aware that novel coronavirus (COVID-19) may be circulating in the community. If you develop symptoms such as fever, cough, or SOB or if you have concerns about the presence of another infection including coronavirus (COVID-19), please contact your health care provider or visit [email or web address].     Disposition/Instructions    Additional COVID19 information to add for patients.   How can I protect others?  If you have symptoms (fever, cough, body aches or trouble breathing): Stay home and away from others (self-isolate) until:    At least 10 days have passed since your symptoms started, And     You ve had no fever--and no medicine that reduces fever--for 1 full day (24 hours), And      Your other symptoms have resolved (gotten better).     If you don t have symptoms, but a test showed that you have COVID-19 (you tested positive):    Stay home and away from others (self-isolate). Follow the tips under \"How do I self-isolate?\" below for 10 days (20 days if you have a weak immune system).    You don't need to be retested for COVID-19 before going back to school or work. As long as you're fever-free and feeling better, you can go back to school, work and other activities after waiting the 10 or 20 days.     How do I self-isolate?    Stay in your own room, even for meals. Use your own bathroom if you can.     Stay " away from others in your home. No hugging, kissing or shaking hands. No visitors.    Don t go to work, school or anywhere else.     Clean  high touch  surfaces often (doorknobs, counters, handles, etc.). Use a household cleaning spray or wipes. You ll find a full list on the EPA website:  www.epa.gov/pesticide-registration/list-n-disinfectants-use-against-sars-cov-2.    Cover your mouth and nose with a mask, tissue or washcloth to avoid spreading germs.    Wash your hands and face often. Use soap and water.    Caregivers in these groups are at risk for severe illness due to COVID-19:  o People 65 years and older  o People who live in a nursing home or long-term care facility  o People with chronic disease (lung, heart, cancer, diabetes, kidney, liver, immunologic)  o People who have a weakened immune system, including those who:  - Are in cancer treatment  - Take medicine that weakens the immune system, such as corticosteroids  - Had a bone marrow or organ transplant  - Have an immune deficiency  - Have poorly controlled HIV or AIDS  - Are obese (body mass index of 40 or higher)  - Smoke regularly    Caregivers should wear gloves while washing dishes, handling laundry and cleaning bedrooms and bathrooms.    Use caution when washing and drying laundry: Don t shake dirty laundry, and use the warmest water setting that you can.    For more tips, go to www.cdc.gov/coronavirus/2019-ncov/downloads/10Things.pdf.    How can I take care of myself?  1. Get lots of rest. Drink extra fluids (unless a doctor has told you not to).     2. Take Tylenol (acetaminophen) for fever or pain. If you have liver or kidney problems, ask your family doctor if it s okay to take Tylenol.     Adults can take either:     650 mg (two 325 mg pills) every 4 to 6 hours, or     1,000 mg (two 500 mg pills) every 8 hours as needed.     Note: Don t take more than 3,000 mg in one day.   Acetaminophen is found in many medicines (both prescribed and  over-the-counter medicines). Read all labels to be sure you don t take too much.     For children, check the Tylenol bottle for the right dose. The dose is based on the child s age or weight.    3. If you have other health problems (like cancer, heart failure, an organ transplant or severe kidney disease): Call your specialty clinic if you don t feel better in the next 2 days.    4. Know when to call 911: Emergency warning signs include:    Trouble breathing or shortness of breath    Pain or pressure in the chest that doesn t go away    Feeling confused like you haven t felt before, or not being able to wake up    Bluish-colored lips or face    What are the symptoms of COVID-19?     The most common symptoms are cough, fever and trouble breathing.     Less common symptoms include body aches, chills, diarrhea (loose, watery poops), fatigue (feeling very tired), headache, runny nose, sore throat and loss of smell.    COVID-19 can cause severe coughing (bronchitis) and lung infection (pneumonia).    How does it spread?     The virus may spread when a person coughs or sneezes into the air. The virus can travel about 6 feet this way, and it can live on surfaces.      Common  (household disinfectants) will kill the virus.    Who is at risk?  Anyone can catch COVID-19 if they re around someone who has the virus.    How can others protect themselves?     Stay away from people who have COVID-19 (or symptoms of COVID-19).    Wash hands often with soap and water. Or, use hand  with at least 60% alcohol.    Avoid touching the eyes, nose or mouth.     Wear a face mask when you go out in public, when sick or when caring for a sick person.    Where can I get more information?     [employer] [place]: About COVID-19: [email or web address]    CDC: What to Do If You re Sick: www.cdc.gov/coronavirus/2019-ncov/about/steps-when-sick.html    CDC: Ending Home Isolation:  www.cdc.gov/coronavirus/2019-ncov/hcp/disposition-in-home-patients.html     CDC: Caring for Someone: www.cdc.gov/coronavirus/2019-ncov/if-you-are-sick/care-for-someone.html     [facility]: Interim Guidance for Hospital Discharge to Home: www.[facility]/diseases/coronavirus/hcp/hospdischarge.pdf    [facility] clinical trials (COVID-19 research studies): clinicalaffairs.[facility]-clinical-trials     Below are the COVID-19 hotlines at the [place] [facility] ([facility]). Interpreters are available.   o For health questions: Call 429-453-9585 or 1-464.906.9622 (7 a.m. to 7 p.m.)  o For questions about schools and childcare: Call 396-142-7226 or 1-414.426.5417 (7 a.m. to 7 p.m.)          Thank you for taking steps to prevent the spread of this virus.  o Limit your contact with others.  o Wear a simple mask to cover your cough.  o Wash your hands well and often.    Resources    [facility]: About COVID-19: [email or web address]    CDC: What to Do If You're Sick: www.cdc.gov/coronavirus/2019-ncov/about/steps-when-sick.html    CDC: Ending Home Isolation: www.cdc.gov/coronavirus/2019-ncov/hcp/disposition-in-home-patients.html     CDC: Caring for Someone: www.cdc.gov/coronavirus/2019-ncov/if-you-are-sick/care-for-someone.html     [facility]: Interim Guidance for Hospital Discharge to Home: www.[facility]./diseases/coronavirus/hcp/hospdischarge.pdf    [facility] clinical trials (COVID-19 research studies): clinicalaffairs.[facility]-clinical-trials     Below are the COVID-19 hotlines at the [place] [facility] ([facility]). Interpreters are available.   o For health questions: Call 347-079-3475 or 1-810.311.2781 (7 a.m. to 7 p.m.)  o For questions about schools and childcare: Call 845-360-8167 or 1-302.531.1623 (7 a.m. to 7 p.m.)

## 2021-09-20 NOTE — RESULT ENCOUNTER NOTE
Final urine culture report is negative.  Adult Negative Urine culture parameters per protocol: Any # Urogenital single or mixed organism, <10,000 col/ml single organism (cath specimen), and <50,000 col/ml single organism (midstream).  [facility] Emergency Dept discharge antibiotic prescribed (If applicable): None  Treatment recommendations per [facility] ED Lab Result Urine Culture protocol.

## 2021-09-21 ENCOUNTER — TELEPHONE (OUTPATIENT)
Dept: EMERGENCY MEDICINE | Facility: CLINIC | Age: 66
End: 2021-09-21

## 2021-09-21 LAB
HIV 1+2 AB+HIV1 P24 AG SERPL QL IA: REACTIVE
HIV 1+2 AB+HIV1P24 AG SERPLBLD IA.RAPID: ABNORMAL
HIV 2 AB SERPLBLD QL IA.RAPID: NONREACTIVE
HIV1 AB SERPLBLD QL IA.RAPID: REACTIVE

## 2021-09-21 NOTE — TELEPHONE ENCOUNTER
[facility] Emergency Department[facility]Ur[facility] Lab result notification:    Reason for call  Notify of lab results, assess symptoms,  review ED providers recommendations (if necessary) and advise per ED lab result f/u protocol.    Lab result  Component      Latest Ref Rng & Units 9/18/2021   HIV 1 Result      Nonreactive Reactive (A)   HIV 2 Result      Nonreactive Nonreactive   HIV Interpretation       HIV-1 POSITIVE   HIV Antigen Antibody Combo      Nonreactive Reactive (A)   2:40  Unable to leave voicemail message requesting a call back to [phone] between 9 a.m. and 5:30 p.m. for patient's ED/UC lab results.      Cary Aguilar, RN  [facility] Result RN  [facility] Emergency Dept Lab Result RN  # [phone]

## 2021-09-21 NOTE — LETTER
September 23, 2021        Norma Sidhu  [address] [address]          Dear Norma Sidhu:    You were seen in the [facility] Emergency Department on September 18, 2021 and we have been unable to reach you by phone, so we are sending you this letter.     It is important that you call [facility] Emergency Department lab result nurse at [phone], as we have information to relay to you AND/OR we MAY have to make some changes in your treatment.    Best time to call back is between 9 a.m. and 5:30 p.m, 7 days a week.        Sincerely,     [facility] Emergency Department Lab Result RN  [phone]

## 2021-09-23 ENCOUNTER — TELEPHONE (OUTPATIENT)
Dept: NURSING | Facility: CLINIC | Age: 66
End: 2021-09-23

## 2021-09-23 NOTE — TELEPHONE ENCOUNTER
Norma returned call.  She was notified of result and encouraged to f/u with Dr Perez on 9/28/21 at 1:40P    Cristofer Farrell RN  [facility]  Emergency Dept Lab Result RN  Ph# [phone]

## 2021-09-23 NOTE — TELEPHONE ENCOUNTER
[facility] Emergency Department Lab result notification:    Reason for Letter being mailed out:      To be notified of abnormal lab result that finalized after Patient was discharge from the Emergency Dept and advised to relay result to PCP, Infectious Disease Specialist.    Unable to reach via telephone so letter sent with a message requesting a call back to [phone] between 9 a.m. and 5:30 p.m., 7 days a week for patient's ED/UC lab results.   Lab result:  Component      Latest Ref Rng & Units 9/18/2021   HIV 1 Result      Nonreactive Reactive (A)   HIV 2 Result      Nonreactive Nonreactive   HIV Interpretation       HIV-1 POSITIVE   HIV Antigen Antibody Combo      Nonreactive Reactive (A)   .     Miscellaneous information: Result has been routed to Infectious Disease Specialist.  Patient has appt 9/28/21 with Julia Perez MD, Infectious Diseases    Cristofer Farrell RN  [facility]  Emergency Dept Lab Result RN  Ph# [phone]

## 2021-09-23 NOTE — TELEPHONE ENCOUNTER
[facility] clinic staff member calling stating pt is there and seeking lab results.   Pt told  staff a message was left for her to call back for lab results but pt cannot find number to return call.   As RN was bringing up pt chart, the pt found the number, called it, and is being helped.   Call ended.     Sharona Yee RN   09/23/21 1:48 PM  [facility] Nurse Advisor

## 2021-09-28 ENCOUNTER — OFFICE VISIT (OUTPATIENT)
Dept: INFECTIOUS DISEASES | Facility: CLINIC | Age: 66
End: 2021-09-28
Payer: MEDICARE

## 2021-09-28 ENCOUNTER — LAB (OUTPATIENT)
Dept: LAB | Facility: CLINIC | Age: 66
End: 2021-09-28
Payer: MEDICARE

## 2021-09-28 VITALS
HEART RATE: 100 BPM | SYSTOLIC BLOOD PRESSURE: 100 MMHG | WEIGHT: 113 LBS | DIASTOLIC BLOOD PRESSURE: 62 MMHG | BODY MASS INDEX: 20.02 KG/M2 | TEMPERATURE: 98.4 F

## 2021-09-28 DIAGNOSIS — B96.89 BACTERIAL VAGINOSIS: ICD-10-CM

## 2021-09-28 DIAGNOSIS — N76.0 BACTERIAL VAGINOSIS: ICD-10-CM

## 2021-09-28 DIAGNOSIS — Z21 ASYMPTOMATIC HUMAN IMMUNODEFICIENCY VIRUS (HIV) INFECTION STATUS (H): Primary | ICD-10-CM

## 2021-09-28 DIAGNOSIS — Z21 ASYMPTOMATIC HUMAN IMMUNODEFICIENCY VIRUS (HIV) INFECTION STATUS (H): ICD-10-CM

## 2021-09-28 LAB
ALBUMIN SERPL-MCNC: 4.1 G/DL (ref 3.5–5)
ALP SERPL-CCNC: 59 U/L (ref 45–120)
ALT SERPL W P-5'-P-CCNC: 23 U/L (ref 0–45)
ANION GAP SERPL CALCULATED.3IONS-SCNC: 9 MMOL/L (ref 5–18)
AST SERPL W P-5'-P-CCNC: 31 U/L (ref 0–40)
BASOPHILS # BLD AUTO: 0 10E3/UL (ref 0–0.2)
BASOPHILS NFR BLD AUTO: 0 %
BILIRUB SERPL-MCNC: 0.4 MG/DL (ref 0–1)
BUN SERPL-MCNC: 15 MG/DL (ref 8–22)
CALCIUM SERPL-MCNC: 9.2 MG/DL (ref 8.5–10.5)
CHLORIDE BLD-SCNC: 106 MMOL/L (ref 98–107)
CO2 SERPL-SCNC: 27 MMOL/L (ref 22–31)
CREAT SERPL-MCNC: 0.76 MG/DL (ref 0.6–1.1)
EOSINOPHIL # BLD AUTO: 0.4 10E3/UL (ref 0–0.7)
EOSINOPHIL NFR BLD AUTO: 8 %
ERYTHROCYTE [DISTWIDTH] IN BLOOD BY AUTOMATED COUNT: 14.2 % (ref 10–15)
GFR SERPL CREATININE-BSD FRML MDRD: 83 ML/MIN/1.73M2
GLUCOSE BLD-MCNC: 87 MG/DL (ref 70–125)
HCT VFR BLD AUTO: 40 % (ref 35–47)
HGB BLD-MCNC: 13.5 G/DL (ref 11.7–15.7)
IMM GRANULOCYTES # BLD: 0 10E3/UL
IMM GRANULOCYTES NFR BLD: 0 %
LYMPHOCYTES # BLD AUTO: 2.6 10E3/UL (ref 0.8–5.3)
LYMPHOCYTES NFR BLD AUTO: 53 %
MCH RBC QN AUTO: 29 PG (ref 26.5–33)
MCHC RBC AUTO-ENTMCNC: 33.8 G/DL (ref 31.5–36.5)
MCV RBC AUTO: 86 FL (ref 78–100)
MONOCYTES # BLD AUTO: 0.4 10E3/UL (ref 0–1.3)
MONOCYTES NFR BLD AUTO: 9 %
NEUTROPHILS # BLD AUTO: 1.5 10E3/UL (ref 1.6–8.3)
NEUTROPHILS NFR BLD AUTO: 30 %
PLATELET # BLD AUTO: 208 10E3/UL (ref 150–450)
POTASSIUM BLD-SCNC: 4.4 MMOL/L (ref 3.5–5)
PROT SERPL-MCNC: 7.4 G/DL (ref 6–8)
RBC # BLD AUTO: 4.65 10E6/UL (ref 3.8–5.2)
SODIUM SERPL-SCNC: 142 MMOL/L (ref 136–145)
WBC # BLD AUTO: 4.9 10E3/UL (ref 4–11)

## 2021-09-28 PROCEDURE — 99000 SPECIMEN HANDLING OFFICE-LAB: CPT

## 2021-09-28 PROCEDURE — 87901 NFCT AGT GNTYP ALYS HIV1 REV: CPT | Mod: 90

## 2021-09-28 PROCEDURE — 80053 COMPREHEN METABOLIC PANEL: CPT

## 2021-09-28 PROCEDURE — 36415 COLL VENOUS BLD VENIPUNCTURE: CPT

## 2021-09-28 PROCEDURE — 87903 PHENOTYPE DNA HIV W/CULTURE: CPT | Mod: 90

## 2021-09-28 PROCEDURE — 87904 PHENOTYPE DNA HIV W/CLT ADD: CPT | Mod: 90

## 2021-09-28 PROCEDURE — 87900 PHENOTYPE INFECT AGENT DRUG: CPT | Mod: 90

## 2021-09-28 PROCEDURE — 87536 HIV-1 QUANT&REVRSE TRNSCRPJ: CPT

## 2021-09-28 PROCEDURE — 86481 TB AG RESPONSE T-CELL SUSP: CPT

## 2021-09-28 PROCEDURE — 86359 T CELLS TOTAL COUNT: CPT | Mod: 59

## 2021-09-28 PROCEDURE — 99205 OFFICE O/P NEW HI 60 MIN: CPT | Performed by: INTERNAL MEDICINE

## 2021-09-28 PROCEDURE — 86360 T CELL ABSOLUTE COUNT/RATIO: CPT

## 2021-09-28 PROCEDURE — 86708 HEPATITIS A ANTIBODY: CPT

## 2021-09-28 PROCEDURE — 87340 HEPATITIS B SURFACE AG IA: CPT

## 2021-09-28 PROCEDURE — 85025 COMPLETE CBC W/AUTO DIFF WBC: CPT

## 2021-09-28 RX ORDER — BICTEGRAVIR SODIUM, EMTRICITABINE, AND TENOFOVIR ALAFENAMIDE FUMARATE 50; 200; 25 MG/1; MG/1; MG/1
1 TABLET ORAL DAILY
Qty: 30 TABLET | Refills: 11 | Status: SHIPPED | OUTPATIENT
Start: 2021-09-28 | End: 2021-09-28

## 2021-09-28 RX ORDER — BICTEGRAVIR SODIUM, EMTRICITABINE, AND TENOFOVIR ALAFENAMIDE FUMARATE 50; 200; 25 MG/1; MG/1; MG/1
1 TABLET ORAL DAILY
Qty: 30 TABLET | Refills: 11 | Status: SHIPPED | OUTPATIENT
Start: 2021-09-28 | End: 2021-10-05

## 2021-09-28 RX ORDER — METRONIDAZOLE 500 MG/1
TABLET ORAL
COMMUNITY
End: 2021-10-26

## 2021-09-28 RX ORDER — METRONIDAZOLE 500 MG/1
500 TABLET ORAL 2 TIMES DAILY
Qty: 14 TABLET | Refills: 0 | Status: SHIPPED | OUTPATIENT
Start: 2021-09-28 | End: 2021-10-05

## 2021-09-28 RX ORDER — CLINDAMYCIN HCL 300 MG
CAPSULE ORAL
COMMUNITY
End: 2022-07-19

## 2021-09-28 NOTE — PATIENT INSTRUCTIONS
Thanks for the visit today  We will check labs today  We are starting antiviral medication, it's very important to take this medication daily. Avoid taking at the same time as multivitamins  It's important to get vaccinated against COVID and influenza.

## 2021-09-28 NOTE — PROGRESS NOTES
Infectious Disease Clinic    Chief Complaint:  Consult (pt states no longer has herpes)    Date: 09/28/2021   Patient name: GERMÁN SIDHU   YOB: 1955  MRN: [id number]    Assessment/Plan:  Germán was seen today for consult.    Diagnoses and all orders for this visit:    Human immunodeficiency virus (HIV) infection status (H): new diagnosis of HIV-1 disease. Needs baseline lab work as below. Discussed U=U, importance of adhering to medications. Questions answered. She is agreeable to starting biktarvy today.   -     HIV-1 RNA quantitative; Future  -     T cell subset profile; Future  -     Comprehensive metabolic panel; Future  -     CBC with platelets and differential; Future  -     Hepatitis B surface antigen; Future  -     Hepatitis Antibody A IgG; Future  -     Quantiferon TB Gold Plus; Future  -     Discontinue: bictegravir-emtricitabine-tenofovir (BIKTARVY) -25 MG per tablet; Take 1 tablet by mouth daily  -     bictegravir-emtricitabine-tenofovir (BIKTARVY) -25 MG per tablet; Take 1 tablet by mouth daily  -     HIV phenotyping with genotype; Future    Bacterial vaginosis  -     metroNIDAZOLE (FLAGYL) 500 MG tablet; Take 1 tablet (500 mg) by mouth 2 times daily for 7 days        Return to clinic in 4 weeks.    Julia Perez MD  [facility]   Clinic phone: [phone]   Clinic fax: [phone]    HPI:  Germán Sidhu is a 65 year old female who is referred for evaluation of HIV.    She was in the [facility] [place] for a year and had unprotected [place] screened positive for HIV earlier this year and has been struggling with the diagnosis and has done a lot of reading about it. Felt viral illness a few weeks ago when she went back to the [facility] [place]. Some burning/itching got some medication there and is now doing better. Currently homeless, living in  [place]. Many questions about the diagnosis. Also many questions about other diagnosis (HPV, trichomonas, etc). She is recently  but says that she could have gotten HIV from her  who had sex with men and women while they were together.     ROS: Complete 10-point ROS is negative except as noted above.    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  Both with psychiatric conditions        Children: Ming Taylor (adults)       Family Medical History:  Family History   Problem Relation Age of Onset     Alzheimer Disease Mother      Hyperlipidemia Father      Coronary Artery Disease Paternal Grandfather         in his 60s     Diabetes No family hx of      Cancer - colorectal No family hx of      Breast Cancer No family hx of      Hypertension No family hx of      Cerebrovascular Disease No family hx of      Colon Cancer No family hx of      Prostate Cancer No family hx of      Other Cancer No family hx of      Depression No family hx of      Anxiety Disorder No family hx of      Mental Illness No family hx of      Substance Abuse No family hx of      Anesthesia Reaction No family hx of      Asthma No family hx of      Osteoporosis No family hx of      Genetic Disorder No family hx of      Thyroid Disease No family hx of      Obesity No family hx of      No FH frequent infections.   Allergies:  Allergies   Allergen Reactions     Nka [No Known Allergies]         Medications:  Current Outpatient Medications   Medication Sig Dispense Refill     bictegravir-emtricitabine-tenofovir (BIKTARVY) -25 MG per tablet Take 1 tablet by mouth daily 30 tablet 11     calcium carb 1250 mg, 500 mg [place],/vitamin D 200 units (CALCIUM CARB 1250 MG, 500 MG [place],/VITAMIN D 200 UNIT) 500-200 MG-UNIT per tablet Take 1 tablet by mouth       metroNIDAZOLE (FLAGYL) 500 MG tablet Take 1 tablet (500 mg) by mouth 2 times daily for 7 days 14 tablet 0     clindamycin (CLEOCIN) 300 MG capsule clindamycin HCl 300 mg capsule   Take 1 capsule twice a day by oral route for 7 days. (Patient not taking: Reported on 9/28/2021)       estradiol (ESTRACE) 0.1 MG/GM vaginal cream INSERT 0.5 G TWICE A WEEK BY VAGINAL ROUTE AT BEDTIME FOR 30 DAYS. (Patient not taking: Reported on 9/28/2021)       metroNIDAZOLE (FLAGYL) 500 MG tablet metronidazole 500 mg tablet   Take 1 tablet by mouth twice per day for 7 days (Patient not taking: Reported on 9/28/2021)       Multiple Vitamins-Minerals (V-C FORTE) CAPS Take 1 tablet by mouth daily. (Patient not taking: Reported on 9/28/2021) 30 capsule 2     valACYclovir (VALTREX) 1000 mg tablet Take 1 tablet (1,000 mg) by mouth 2 times daily for 10 days 20 tablet 0       Immunizations:  Immunization History   Administered Date(s) Administered     FLU 6-35 months 10/16/2008     Influenza (IIV3) PF 11/13/2006, 02/08/2011, 11/12/2016     Influenza Vaccine IM > 6 months Valent IIV4 (Alfuria,Fluzone) 12/02/2014, 12/03/2015     TD (ADULT, 7+) 11/04/2004     Tdap (Adacel,Boostrix) 02/08/2011     Tdap (Adult) Unspecified Formulation 11/04/2006     Zoster vaccine, live 11/12/2016       Exam:  B/P: 100/62, T: 98.4, P: 100, R: Data Unavailable, Weight: 113 lbs 0 oz  Gen: Alert and in no distress.   Psych: Normal affect. Alert and oriented. Some tangential thinking.   HEENT:  No icterus. Oropharynx pink and moist without lesions.   Neck: No lymphadenopathy.   CV: Regular rate and rhythm  without m/r/g.   Chest: Clear to auscultation bilaterally without wheezes or crackles.   Abdomen: Soft, non-distended. Non-tender. Normal bowel sounds.   Extremities: Warm and well perfused.   Skin: No rashes or lesions noted.     Labs:  Reviewed in EPIC.     Imaging:  No results found for this or any previous visit (from the past 744 hour(s)).     Total Time Spent 60 minutes including chart review, time with patient, orders, and documentation.

## 2021-09-29 LAB
CD3 CELLS # BLD: 2220 CELLS/UL (ref 603–2990)
CD3 CELLS NFR BLD: 84 % (ref 49–84)
CD3+CD4+ CELLS # BLD: 490 CELLS/UL (ref 441–2156)
CD3+CD4+ CELLS NFR BLD: 19 % (ref 28–63)
CD3+CD4+ CELLS/CD3+CD8+ CLL BLD: 0.28 % (ref 1.4–2.6)
CD3+CD8+ CELLS # BLD: 1732 CELLS/UL (ref 125–1312)
CD3+CD8+ CELLS NFR BLD: 66 % (ref 10–40)
GAMMA INTERFERON BACKGROUND BLD IA-ACNC: 0.41 IU/ML
HAV IGG SER QL IA: NEGATIVE
HBV SURFACE AG SERPL QL IA: NONREACTIVE
M TB IFN-G BLD-IMP: NEGATIVE
M TB IFN-G CD4+ BCKGRND COR BLD-ACNC: 9.59 IU/ML
MITOGEN IGNF BCKGRD COR BLD-ACNC: 0 IU/ML
MITOGEN IGNF BCKGRD COR BLD-ACNC: 0.01 IU/ML
QUANTIFERON MITOGEN: 10 IU/ML
QUANTIFERON NIL TUBE: 0.41 IU/ML
QUANTIFERON TB1 TUBE: 0.41 IU/ML
QUANTIFERON TB2 TUBE: 0.42
T CELL COMMENT: ABNORMAL

## 2021-09-30 ENCOUNTER — TELEPHONE (OUTPATIENT)
Dept: INFECTIOUS DISEASES | Facility: CLINIC | Age: 66
End: 2021-09-30

## 2021-09-30 DIAGNOSIS — Z21 ASYMPTOMATIC HUMAN IMMUNODEFICIENCY VIRUS (HIV) INFECTION STATUS (H): ICD-10-CM

## 2021-09-30 NOTE — TELEPHONE ENCOUNTER
Dr Perez & Team      Pt left message last night about:     - bictegravir-emtricitabine-tenofovir (BIKTARVY) -25 MG per tablet    States due to insurance, RX has to be sent to [facility]. Wondering if we received this request from the pharmacy, as she has not been able to pick meds up and doesn't know who she should be asking it from.    please call her back @ [phone]

## 2021-09-30 NOTE — TELEPHONE ENCOUNTER
Patient calling back about this.    Wondering if this can be sent to a [facility] instead. She is currently homeless, so all the mail-pharmacies will not work for her.    Please call her back @ [phone]

## 2021-09-30 NOTE — TELEPHONE ENCOUNTER
Called pharmacy helpdesk to see if pt can fill Biktarvy at local retail pharmacy or if it needs to be filled at a specialty pharmacy. Agent stated it has to be specialty pharmacy. I informed them pt is currently homeless and has no physical address to ship/mail medication to and when tried to fill with [place] specialty it wasn't allow because that pharmacy is not contracted with plan. I asked what other specialty pharmacies in [place] pt can fill at or if there is a way to get an exception made - agent was not able to find this information. Provided me with [phone] who is supposed to be able to inform me what pharmacy pt can use.     Called this phone number - which ended up being [facility]. I explained to them the situation. They can ship to the clinic so the pt can  from clinic but pt would need to call [phone] to give permission for this.

## 2021-09-30 NOTE — TELEPHONE ENCOUNTER
Called and spoke with Norma and she will call [facility] to get account set up and give permission to ship to the clinic. I provider her the the clinic address and my direct phone number is case she runs into any additional issues.

## 2021-10-01 LAB
HIV1 RNA # PLAS NAA DL=20: ABNORMAL COPIES/ML
HIV1 RNA SERPL NAA+PROBE-LOG#: 4.6 {LOG_COPIES}/ML

## 2021-10-04 ENCOUNTER — TELEPHONE (OUTPATIENT)
Dept: FAMILY MEDICINE | Facility: CLINIC | Age: 66
End: 2021-10-04

## 2021-10-04 NOTE — TELEPHONE ENCOUNTER
Called patient    Patient actually feels fine.  Unsure where and when she got this.  Has notified her ex  about her positive HIV status.    Currently homeless.  Had been living with kid in [facility].  .  Has most recently been living in her car or in motels/hotels and stayed also [place] new boyfriend [place].      Had [place] Had a boyfriend       Last month had some itching wasn't sure if it was from some new estrogen cream or vaginitis or from sex.    Recommended follow up visit with me: review her questions, her homeless status, possibly psych issues if anxious/depressed other.

## 2021-10-04 NOTE — TELEPHONE ENCOUNTER
Pt called to notify PCP that she has been confirmed positive for HIV. If any questions please call patient.

## 2021-10-05 RX ORDER — BICTEGRAVIR SODIUM, EMTRICITABINE, AND TENOFOVIR ALAFENAMIDE FUMARATE 50; 200; 25 MG/1; MG/1; MG/1
1 TABLET ORAL DAILY
Qty: 90 TABLET | Refills: 3 | Status: SHIPPED | OUTPATIENT
Start: 2021-10-05 | End: 2021-11-27

## 2021-10-05 NOTE — TELEPHONE ENCOUNTER
"Pt called and LM that [facility] states they are ready to ship medication but there is an issue. Called [facility] and spoke with Candy. Pt is using name \"Norma Sidhu\" at [facility]. She told me that pt needs to call in to give permission to ship to the clinic. I informed Katelyn that Norma did that when she called to set up her profile with [facility]. She then checked records and saw pt called yesterday to schedule her order set up to ship to address: [email or web address] which is correct. Confirmed it was delivered to the clinic at 10:17am this morning and left at the dock according to UPS proof of delivery.           "

## 2021-10-07 ENCOUNTER — OFFICE VISIT (OUTPATIENT)
Dept: ORTHOPEDICS | Facility: CLINIC | Age: 66
End: 2021-10-07
Payer: MEDICARE

## 2021-10-07 VITALS
SYSTOLIC BLOOD PRESSURE: 106 MMHG | HEIGHT: 63 IN | DIASTOLIC BLOOD PRESSURE: 68 MMHG | BODY MASS INDEX: 20.2 KG/M2 | WEIGHT: 114 LBS

## 2021-10-07 DIAGNOSIS — Z53.9 ERRONEOUS ENCOUNTER--DISREGARD: Primary | ICD-10-CM

## 2021-10-07 PROCEDURE — 99207 PR NO CHARGE LOS: CPT | Performed by: FAMILY MEDICINE

## 2021-10-07 ASSESSMENT — MIFFLIN-ST. JEOR: SCORE: 1036.23

## 2021-10-07 NOTE — PROGRESS NOTES
Norma Sidhu  :  1955  DOS: 10/7/2021  MRN: [id number]    Sports Medicine Clinic Visit    PCP: Mariela Ji    Norma Sidhu is a 65 year old Right hand dominant female who is seen as a self referral presenting with generalized upper back pain, concerns for osteoporosis.    Norma comes in today to discuss preventative screening for osteoporosis, which was recommended by her PCP.  I advised her that a DEXA scan could be ordered and was reasonable for screening when she turns 66. Ordinarily PCP can order this and manage vs refer to endocrine if needed.    There are two sports medicine provider at the [facility], Dr Harrington and Dr Pantoja, who manage osteoporosis and bone health, but this would not be convenient for her.     Ultimately we did not order DEXA and she would like to f/u with PCP instead.  Given that no orders will be placed, I informed her that I would not charge for her visit today.    She also noted 20 yr hx of inability to extend her left thumb IP joint actively, passive extension is full.  We reviewed likely diagnosis of extensor tendon rupture, and I advised consulting with Dr Martin of [facility] if she would like to discuss surgical reconstruction, but given her overall good function for ADLs, this may not be needed.    All questions answered.    Naseem Jo DO, RENETTA  Sports Medicine Physician  [facility]          Disclaimer: This note consists of symbols derived from keyboarding, dictation and/or voice recognition software. As a result, there may be errors in the script that have gone undetected. Please consider this when interpreting information found in this chart.

## 2021-10-07 NOTE — LETTER
10/7/2021         RE: Norma Sidhu  [address] [address]        Dear Colleague,    Thank you for referring your patient, Norma Sidhu, to the [facility] [place]. Please see a copy of my visit note below.    Norma Sidhu  :  1955  DOS: 10/7/2021  MRN: [id number]    Sports Medicine Clinic Visit    PCP: Mariela Ji    Norma Sidhu is a 65 year old Right hand dominant female who is seen as a self referral presenting with generalized upper back pain, concerns for osteoporosis.    Norma comes in today to discuss preventative screening for osteoporosis, which was recommended by her PCP.  I advised her that a DEXA scan could be ordered and was reasonable for screening when she turns 66. Ordinarily PCP can order this and manage vs refer to endocrine if needed.    There are two sports medicine provider at the [facility], Dr Harrington and Dr Pantoja, who manage osteoporosis and bone health, but this would not be convenient for her.     Ultimately we did not order DEXA and she would like to f/u with PCP instead.  Given that no orders will be placed, I informed her that I would not charge for her visit today.    She also noted 20 yr hx of inability to extend her left thumb IP joint actively, passive extension is full.  We reviewed likely diagnosis of extensor tendon rupture, and I advised consulting with Dr Martin of [facility] if she would like to discuss surgical reconstruction, but given her overall good function for ADLs, this may not be needed.    All questions answered.    Naseem Jo DO, RENETTA  Sports Medicine Physician  [facility]          Disclaimer: This note consists of symbols derived from keyboarding, dictation and/or voice recognition software. As a result, there may be errors in the script that have gone undetected. Please consider this when interpreting  information found in this chart.      Again, thank you for allowing me to participate in the care of your patient.        Sincerely,        Naseem Jo, DO

## 2021-10-08 ENCOUNTER — TELEPHONE (OUTPATIENT)
Dept: INFECTIOUS DISEASES | Facility: CLINIC | Age: 66
End: 2021-10-08

## 2021-10-08 NOTE — TELEPHONE ENCOUNTER
Patient called. She took her Bictarvy medication and her hand turned red. She is wondering what to do.    Please advise.     Norma @  [phone]

## 2021-10-13 NOTE — TELEPHONE ENCOUNTER
Pt called back, she states that she stopped using coconut oil and Vaseline and the itching on her hands has stopped. The pt is still using biktarvy. The pt is wondering if her HIV test is positive, which I informed yes, she is wondering if I am scamming her, which I informed no, the pt then said goodbye.

## 2021-10-26 ENCOUNTER — LAB (OUTPATIENT)
Dept: LAB | Facility: CLINIC | Age: 66
End: 2021-10-26
Payer: MEDICARE

## 2021-10-26 ENCOUNTER — OFFICE VISIT (OUTPATIENT)
Dept: INFECTIOUS DISEASES | Facility: CLINIC | Age: 66
End: 2021-10-26
Payer: MEDICARE

## 2021-10-26 VITALS
DIASTOLIC BLOOD PRESSURE: 72 MMHG | TEMPERATURE: 98.6 F | HEART RATE: 100 BPM | BODY MASS INDEX: 20.19 KG/M2 | SYSTOLIC BLOOD PRESSURE: 100 MMHG | WEIGHT: 114 LBS

## 2021-10-26 DIAGNOSIS — Z21 HIV INFECTION, UNSPECIFIED SYMPTOM STATUS (H): ICD-10-CM

## 2021-10-26 DIAGNOSIS — Z21 HIV INFECTION, UNSPECIFIED SYMPTOM STATUS (H): Primary | ICD-10-CM

## 2021-10-26 PROCEDURE — 36415 COLL VENOUS BLD VENIPUNCTURE: CPT

## 2021-10-26 PROCEDURE — 87536 HIV-1 QUANT&REVRSE TRNSCRPJ: CPT

## 2021-10-26 PROCEDURE — 99214 OFFICE O/P EST MOD 30 MIN: CPT | Performed by: INTERNAL MEDICINE

## 2021-10-26 NOTE — PROGRESS NOTES
INFECTIOUS DISEASE [facility]IC FOLLOW UP NOTE      Date: 10/26/2021   Patient Name: Norma Sidhu   YOB: 1955  MRN: [id number]      ASSESSMENT:  Diagnoses and all orders for this visit:  HIV infection, unspecified symptom status (H): new diagnosis last visit, CD4 490, VL ~40K. Reviewed labs with patient. Compliant with medications. Discussed U=U.   -     HIV-1 RNA quantitative; Future   - continue biktarvy   - should get hep B and Hep A vaccination, she deferred today   - biggest issue likely stable housing-she is working on this. Recommended she touch base with PCP to see if clinic assistance available.     Return to clinic 2 months.    Julia Perez MD  [facility]   Clinic phone: [phone]   Clinic fax: [phone]    ______________________________________________________________________    SUBJECTIVE / INTERVAL HISTORY: Norma Sidhu returns for follow up of HIV.     She is doing ok. Able to  the medications at clinic and has been taking every morning. Still staying at [employer], working on housing. Her ex  tested negative so she states she's not sure where she got HIV; states everyone in [place] takes pills so they are ok.     ROS: All other systems negative except as listed above.      Current Outpatient Medications:      bictegravir-emtricitabine-tenofovir (BIKTARVY) -25 MG per tablet, Take 1 tablet by mouth daily, Disp: 90 tablet, Rfl: 3     calcium carb 1250 mg, 500 mg [place],/vitamin D 200 units (CALCIUM CARB 1250 MG, 500 MG [place],/VITAMIN D 200 UNIT) 500-200 MG-UNIT per tablet, Take 1 tablet by mouth, Disp: , Rfl:      clindamycin (CLEOCIN) 300 MG capsule, clindamycin HCl 300 mg capsule  Take 1 capsule twice a day by oral route for 7 days. (Patient not taking: Reported on 9/28/2021), Disp: , Rfl:      estradiol (ESTRACE) 0.1 MG/GM vaginal cream, INSERT 0.5 G TWICE A WEEK BY VAGINAL ROUTE AT BEDTIME  FOR 30 DAYS. (Patient not taking: Reported on 9/28/2021), Disp: , Rfl:      Multiple Vitamins-Minerals (V-C FORTE) CAPS, Take 1 tablet by mouth daily. (Patient not taking: Reported on 9/28/2021), Disp: 30 capsule, Rfl: 2     valACYclovir (VALTREX) 1000 mg tablet, Take 1 tablet (1,000 mg) by mouth 2 times daily for 10 days, Disp: 20 tablet, Rfl: 0      OBJECTIVE:  /72   Pulse 100   Temp 98.6  F (37  C)   Wt 51.7 kg (114 lb)   BMI 20.19 kg/m        GEN: No acute distress.    RESPIRATORY:  Normal breathing pattern. Clear to auscultation  CARDIOVASCULAR:  Regular rate and rhythm. No murmur, click, gallop or rub.   ABDOMEN:  Soft, normal bowel sounds, non-tender, no masses, no organomegaly.  EXTREMITIES: No edema.  SKIN/HAIR/NAILS:  No rashes      Pertinent labs:    No results found for: CRP  Last Comprehensive Metabolic Panel:  Sodium   Date Value Ref Range Status   09/28/2021 142 136 - 145 mmol/L Final   08/13/2011 141 133 - 144 mmol/L Final     Potassium   Date Value Ref Range Status   09/28/2021 4.4 3.5 - 5.0 mmol/L Final   08/13/2011 3.9 3.4 - 5.3 mmol/L Final     Chloride   Date Value Ref Range Status   09/28/2021 106 98 - 107 mmol/L Final   08/13/2011 101 94 - 109 mmol/L Final     Carbon Dioxide   Date Value Ref Range Status   08/13/2011 30 20 - 32 mmol/L Final     Carbon Dioxide (CO2)   Date Value Ref Range Status   09/28/2021 27 22 - 31 mmol/L Final     Anion Gap   Date Value Ref Range Status   09/28/2021 9 5 - 18 mmol/L Final   08/13/2011 10.2 6 - 17 mmol/L Final     Glucose   Date Value Ref Range Status   09/28/2021 87 70 - 125 mg/dL Final   08/13/2011 80 60 - 99 mg/dL Final     Urea Nitrogen   Date Value Ref Range Status   09/28/2021 15 8 - 22 mg/dL Final   08/13/2011 12 7 - 30 mg/dL Final     Creatinine   Date Value Ref Range Status   09/28/2021 0.76 0.60 - 1.10 mg/dL Final   08/13/2011 0.70 0.52 - 1.04 mg/dL Final     GFR Estimate   Date Value Ref Range Status   09/28/2021 83 >60 mL/min/1.73m2  Final     Comment:     As of July 11, 2021, eGFR is calculated by the CKD-EPI creatinine equation, without race adjustment. eGFR can be influenced by muscle mass, exercise, and diet. The reported eGFR is an estimation only and is only applicable if the renal function is stable.   08/13/2011 87 >60 mL/min/1.7m2 Final     Calcium   Date Value Ref Range Status   09/28/2021 9.2 8.5 - 10.5 mg/dL Final   08/13/2011 9.3 8.5 - 10.4 mg/dL Final     CBC RESULTS:   Recent Labs   Lab Test 09/28/21  1441   WBC 4.9   RBC 4.65   HGB 13.5   HCT 40.0   MCV 86   MCH 29.0   MCHC 33.8   RDW 14.2          MICROBIOLOGY DATA:    T Cell Subset:  Absolute CD4, [place] T Cells   Date Value Ref Range Status   09/28/2021 490 441-2,156 cells/uL Final     CD4% [place] T Cells   Date Value Ref Range Status   09/28/2021 19 (L) 28 - 63 % Final     CD8% Suppressor T Cells   Date Value Ref Range Status   09/28/2021 66 (H) 10 - 40 % Final     CD3% Total T Cells   Date Value Ref Range Status   09/28/2021 84 49 - 84 % Final     CD4:CD8 Ratio   Date Value Ref Range Status   09/28/2021 0.28 (L) 1.40 - 2.60 Final     WBC   Date Value Ref Range Status   08/13/2011 5.1 4.0 - 11.0 10e9/L Final     WBC Count   Date Value Ref Range Status   09/28/2021 4.9 4.0 - 11.0 10e3/uL Final     % Lymphocytes   Date Value Ref Range Status   09/28/2021 53 % Final   08/13/2011 32.9 20 - 48 % Final     Absolute CD3, Total T Cells   Date Value Ref Range Status   09/28/2021 2,220 603-2,990 cells/uL Final     Absolute CD8, Suppressor T Cells   Date Value Ref Range Status   09/28/2021 1,732 (H) 125-1,312 cells/uL Final       HIV-1 RNA Quantitative:  40K    RADIOLOGY:    No results found for this or any previous visit (from the past 744 hour(s)).     Total Time Spent 30 minutes including chart review, time with patient, orders, and documentation.

## 2021-10-29 ENCOUNTER — TELEPHONE (OUTPATIENT)
Dept: INFECTIOUS DISEASES | Facility: CLINIC | Age: 66
End: 2021-10-29

## 2021-10-29 LAB
HIV1 RNA # PLAS NAA DL=20: 32 COPIES/ML
HIV1 RNA SERPL NAA+PROBE-LOG#: 1.5 {LOG_COPIES}/ML

## 2021-10-29 NOTE — TELEPHONE ENCOUNTER
Patient called. She would like to discuss lab results and would like to know if the medication is working.    Norma @ [phone]

## 2021-10-29 NOTE — TELEPHONE ENCOUNTER
Pt called back and I informed that her vital load has come down nicely, the medication is working, and she should continue on the medication. Pt understands.

## 2021-11-27 ENCOUNTER — OFFICE VISIT (OUTPATIENT)
Dept: URGENT CARE | Facility: URGENT CARE | Age: 66
End: 2021-11-27
Payer: MEDICARE

## 2021-11-27 VITALS
SYSTOLIC BLOOD PRESSURE: 106 MMHG | DIASTOLIC BLOOD PRESSURE: 60 MMHG | BODY MASS INDEX: 20.83 KG/M2 | WEIGHT: 117.6 LBS | TEMPERATURE: 98.6 F | OXYGEN SATURATION: 98 % | RESPIRATION RATE: 18 BRPM | HEART RATE: 90 BPM

## 2021-11-27 DIAGNOSIS — F20.3 UNDIFFERENTIATED SCHIZOPHRENIA (H): ICD-10-CM

## 2021-11-27 DIAGNOSIS — Z59.00 HOMELESSNESS: ICD-10-CM

## 2021-11-27 DIAGNOSIS — C43.70 MALIGNANT MELANOMA OF SKIN OF LOWER EXTREMITY, INCLUDING HIP, UNSPECIFIED LATERALITY (H): ICD-10-CM

## 2021-11-27 DIAGNOSIS — B00.9 HERPES SIMPLEX VIRUS INFECTION: Primary | ICD-10-CM

## 2021-11-27 DIAGNOSIS — Z21 ASYMPTOMATIC HUMAN IMMUNODEFICIENCY VIRUS (HIV) INFECTION STATUS (H): ICD-10-CM

## 2021-11-27 PROCEDURE — 99213 OFFICE O/P EST LOW 20 MIN: CPT | Performed by: FAMILY MEDICINE

## 2021-11-27 RX ORDER — BICTEGRAVIR SODIUM, EMTRICITABINE, AND TENOFOVIR ALAFENAMIDE FUMARATE 50; 200; 25 MG/1; MG/1; MG/1
1 TABLET ORAL DAILY
Qty: 90 TABLET | Refills: 3 | Status: SHIPPED | OUTPATIENT
Start: 2021-11-27 | End: 2022-10-13

## 2021-11-27 RX ORDER — VALACYCLOVIR HYDROCHLORIDE 1 G/1
1000 TABLET, FILM COATED ORAL 2 TIMES DAILY
Qty: 30 TABLET | Refills: 1 | Status: SHIPPED | OUTPATIENT
Start: 2021-11-27 | End: 2022-07-27

## 2021-11-27 SDOH — ECONOMIC STABILITY - HOUSING INSECURITY: HOMELESSNESS UNSPECIFIED: Z59.00

## 2021-11-27 ASSESSMENT — ENCOUNTER SYMPTOMS
CONSTITUTIONAL NEGATIVE: 1
ENDOCRINE NEGATIVE: 1
PSYCHIATRIC NEGATIVE: 1
RESPIRATORY NEGATIVE: 1
ALLERGIC/IMMUNOLOGIC NEGATIVE: 1
EYES NEGATIVE: 1
MUSCULOSKELETAL NEGATIVE: 1
NEUROLOGICAL NEGATIVE: 1
HEMATOLOGIC/LYMPHATIC NEGATIVE: 1
DIARRHEA: 1

## 2021-11-27 NOTE — PROGRESS NOTES
SUBJECTIVE:   Norma Sidhu is a 66 year old female presenting with a chief complaint of   Chief Complaint   Patient presents with     Diarrhea     Patient is having diarrhea, asking for Valacyclovir medication for herpes, HPV sores on vaginal area, under a lot of stress that's why she has diarrhea- homeless.     Vaginal Problem       She is an established patient of [facility].    Patient is a 66 yr old female with HIV here for medication refills. She also has herpes and according to her she has a break out flare presently and needs refills on her valacyclovir. She sees an ID specialist at the [facility] and she had a recent visit where her HIV medication were refilled.  Patient is homeless and says at the last shelter she was at she forgot her HIV medication and she also requesting refills of these.  She has a past medical hostory significant for schizophrenia and she is not on any psych medications.     GYN Complaint    Onset of symptoms was 1 year(s) ago.  Course of illness is waxing and waning.    Severity moderate  Current and Associated symptoms: pelvic pain -  burning and lesions  Treatment measures tried include:  She has been on antivirals in the past  Sexually active: yes, multiple partners, contraception - none  Predisposing factors: None  Hx of previous symptom: none and frequent        Review of Systems   Constitutional: Negative.    HENT: Negative.    Eyes: Negative.    Respiratory: Negative.    Gastrointestinal: Positive for diarrhea.   Endocrine: Negative.    Genitourinary: Positive for genital sores.   Musculoskeletal: Negative.    Skin: Negative.    Allergic/Immunologic: Negative.    Neurological: Negative.    Hematological: Negative.    Psychiatric/Behavioral: Negative.        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     Family History   Problem Relation Age of Onset     Alzheimer Disease Mother      Hyperlipidemia Father      Coronary Artery Disease Paternal Grandfather         in his 60s     Diabetes No family hx of      Cancer - colorectal No family hx of      Breast Cancer No family hx of      Hypertension No family hx of      Cerebrovascular Disease No family hx of      Colon Cancer No family hx of      Prostate Cancer No family hx of      Other Cancer No family hx of      Depression No family hx of      Anxiety Disorder No family hx of      Mental Illness No family hx of      Substance Abuse No family hx of      Anesthesia Reaction No family hx of      Asthma No family hx of      Osteoporosis No family hx of      Genetic Disorder No family hx of      Thyroid Disease No family hx of      Obesity No family hx of      Current Outpatient Medications   Medication Sig Dispense Refill     bictegravir-emtricitabine-tenofovir (BIKTARVY) -25 MG per tablet Take 1 tablet by mouth daily 90 tablet 3     valACYclovir (VALTREX) 1000 mg tablet Take 1 tablet (1,000 mg) by mouth 2 times daily 30 tablet 1     calcium carb 1250 mg, 500 mg [place],/vitamin D 200 units (CALCIUM CARB 1250 MG, 500 MG [place],/VITAMIN D 200 UNIT) 500-200 MG-UNIT per tablet Take 1 tablet by mouth (Patient not taking: Reported on 11/27/2021)       clindamycin (CLEOCIN) 300 MG capsule clindamycin HCl 300 mg capsule   Take 1 capsule twice a day by oral route for 7 days. (Patient not taking: Reported on 9/28/2021)       estradiol (ESTRACE) 0.1 MG/GM vaginal cream INSERT 0.5 G TWICE A WEEK BY VAGINAL ROUTE AT BEDTIME FOR 30 DAYS. (Patient not taking: Reported on 9/28/2021)       Multiple Vitamins-Minerals (V-C FORTE) CAPS Take 1 tablet by mouth daily. (Patient not taking: Reported on 9/28/2021) 30 capsule 2     Social History     Tobacco Use     Smoking status: Never Smoker     Smokeless tobacco: Never Used   Substance Use Topics     Alcohol  use: No     Alcohol/week: 0.0 standard drinks       OBJECTIVE  /60 (BP Location: Right arm, Patient Position: Sitting, Cuff Size: Adult Regular)   Pulse 90   Temp 98.6  F (37  C) (Tympanic)   Resp 18   Wt 53.3 kg (117 lb 9.6 oz)   SpO2 98%   BMI 20.83 kg/m      Physical Exam  Constitutional:       Appearance: Normal appearance.   HENT:      Head: Normocephalic and atraumatic.   Eyes:      Pupils: Pupils are equal, round, and reactive to light.   Musculoskeletal:         General: Normal range of motion.   Skin:     General: Skin is warm and dry.   Neurological:      Mental Status: She is alert and oriented to person, place, and time.   Psychiatric:         Attention and Perception: Attention normal.         Mood and Affect: Affect is blunt.         Speech: Speech is tangential.         Behavior: Behavior is cooperative.         Thought Content: Thought content normal.         Labs:  No results found for this or any previous visit (from the past 24 hour(s)).    X-Ray was not done.    ASSESSMENT:      ICD-10-CM    1. Herpes simplex virus infection  B00.9 valACYclovir (VALTREX) 1000 mg tablet   2. Asymptomatic human immunodeficiency virus (HIV) infection status (H)  Z21 bictegravir-emtricitabine-tenofovir (BIKTARVY) -25 MG per tablet   3. 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 Malignant melanoma of skin of lower extremity, including hip, unspecified laterality (H)  C43.70    5. Homelessness  Z59.00     66 yr old female with HIV here for medication refills. Her medical history is complicated by homelessness. I refilled her Valtrex and HIV medication. Recommend that she goes into [facility] for some assistance with [facility].   She has not been compliant with her psych medication and treatment.    Medical Decision Making:    Differential Diagnosis:      Serious Comorbid Conditions:  Adult:  HIV    PLAN:    Gyn Problem:  Medication refilled    Followup:    If not improving or if condition worsens,  follow up with your Primary Care Provider    There are no Patient Instructions on file for this visit.

## 2021-12-03 ENCOUNTER — TELEPHONE (OUTPATIENT)
Dept: INFECTIOUS DISEASES | Facility: CLINIC | Age: 66
End: 2021-12-03
Payer: MEDICARE

## 2021-12-03 NOTE — TELEPHONE ENCOUNTER
Dr cross and Team    Patient received a call from [facility]  That the Biktarvy is available for  today.    She wants to make sure that we have it to pick it up today.    Please call her to confirm @ [phone]     Patient seen and examined at bedside. Repeat ABG results reviewed:     ABG - ( 25 May 2017 22:50 )  pH: 7.19  /  pCO2: 73    /  pO2: 110   / HCO3: 22    / Base Excess: -0.3  /  SaO2: 97.6  ---->  ABG - ( 26 May 2017 00:20 )  pH: 7.17  /  pCO2: 76    /  pO2: 101   / HCO3: 22    / Base Excess: -1.2  /  SaO2: 97.1    Results suggestive of worsening respiratory acidosis with increasing pCO2 and decreasing pH. Patient breathing comfortably at this time, following commands, with BiPAP in place. MICU aware, will likely admit patient for closer monitoring/impending respiratory failure. Case discussed with Dr. Perry.

## 2021-12-06 NOTE — TELEPHONE ENCOUNTER
Called pt to informed medication has arrive. States she is 5 hours away and will call before coming to clinic. Pt uses profanity and is unhappy with  staff.

## 2021-12-07 ENCOUNTER — MEDICAL CORRESPONDENCE (OUTPATIENT)
Dept: HEALTH INFORMATION MANAGEMENT | Facility: CLINIC | Age: 66
End: 2021-12-07
Payer: MEDICARE

## 2022-01-10 ENCOUNTER — MEDICAL CORRESPONDENCE (OUTPATIENT)
Dept: HEALTH INFORMATION MANAGEMENT | Facility: CLINIC | Age: 67
End: 2022-01-10
Payer: MEDICARE

## 2022-03-31 ENCOUNTER — TELEPHONE (OUTPATIENT)
Dept: FAMILY MEDICINE | Facility: CLINIC | Age: 67
End: 2022-03-31
Payer: MEDICARE

## 2022-03-31 NOTE — TELEPHONE ENCOUNTER
"Patient was not being specific on what she needed to be seen for and very rude and didnt get a chance to verify insurance before we disconnected - this appt 5/11/2022 I put her down for F/U only because she was in a hurry to hang up and be \"done'. Per patient she spoke to Dr. Ji and stated she would be seen sooner if it was just a follow up, but I informed her [facility] is only virtual clinic if she was okay with Video or telephone, patient declined and was upset.   "

## 2022-05-09 ENCOUNTER — TELEPHONE (OUTPATIENT)
Dept: FAMILY MEDICINE | Facility: CLINIC | Age: 67
End: 2022-05-09
Payer: MEDICARE

## 2022-05-09 NOTE — TELEPHONE ENCOUNTER
[facility] phone call message- general phone call:    Reason for call: Patient called to cancel appointment and inform  she was seen at [facility] for same reason she was going to come in and was informed her HIV count was 0.     Return call needed: No    OK to leave a message on voice mail? Yes    Primary language: English      needed? No    Call taken on May 9, 2022 at 2:08 PM by Bettie Batres

## 2022-05-31 ENCOUNTER — OFFICE VISIT (OUTPATIENT)
Dept: URGENT CARE | Facility: URGENT CARE | Age: 67
End: 2022-05-31
Payer: MEDICARE

## 2022-05-31 VITALS
SYSTOLIC BLOOD PRESSURE: 97 MMHG | DIASTOLIC BLOOD PRESSURE: 65 MMHG | OXYGEN SATURATION: 98 % | TEMPERATURE: 97.9 F | HEART RATE: 91 BPM | RESPIRATION RATE: 18 BRPM

## 2022-05-31 DIAGNOSIS — R30.0 DYSURIA: ICD-10-CM

## 2022-05-31 DIAGNOSIS — B00.9 HERPES SIMPLEX VIRUS INFECTION: Primary | ICD-10-CM

## 2022-05-31 LAB
ALBUMIN UR-MCNC: NEGATIVE MG/DL
APPEARANCE UR: CLEAR
BILIRUB UR QL STRIP: NEGATIVE
COLOR UR AUTO: YELLOW
GLUCOSE UR STRIP-MCNC: NEGATIVE MG/DL
HGB UR QL STRIP: NEGATIVE
KETONES UR STRIP-MCNC: NEGATIVE MG/DL
LEUKOCYTE ESTERASE UR QL STRIP: NEGATIVE
NITRATE UR QL: NEGATIVE
PH UR STRIP: 6 [PH] (ref 5–7)
SP GR UR STRIP: 1.01 (ref 1–1.03)
UROBILINOGEN UR STRIP-ACNC: 0.2 E.U./DL

## 2022-05-31 PROCEDURE — 99213 OFFICE O/P EST LOW 20 MIN: CPT | Performed by: PHYSICIAN ASSISTANT

## 2022-05-31 PROCEDURE — 81003 URINALYSIS AUTO W/O SCOPE: CPT | Performed by: PHYSICIAN ASSISTANT

## 2022-05-31 RX ORDER — VALACYCLOVIR HYDROCHLORIDE 1 G/1
1000 TABLET, FILM COATED ORAL 2 TIMES DAILY
Qty: 20 TABLET | Refills: 1 | Status: SHIPPED | OUTPATIENT
Start: 2022-05-31 | End: 2022-07-27

## 2022-05-31 NOTE — PROGRESS NOTES
Assessment & Plan     Herpes simplex virus infection    - valACYclovir (VALTREX) 1000 mg tablet; Take 1 tablet (1,000 mg) by mouth 2 times daily for 10 days    Dysuria    - UA Macro with Reflex to Micro and Culture - lab collect; Future  - UA Macro with Reflex to Micro and Culture - lab collect                 No follow-ups on file.    Clara Cuenca PA-C  [facility]              Subjective   Chief Complaint   Patient presents with     Herpes Simplex Evaluation     Herpes symplex outbreak, would like med refill.         HPI     Herpes     Onset of symptoms was this morning .  Course of illness is same.    Severity mild  Current and Associated symptoms: vaginal lesion   Treatment measures tried include None tried.  Predisposing factors include history of herpes lesions .                Review of Systems   Constitutional, HEENT, cardiovascular, pulmonary, gi and gu systems are negative, except as otherwise noted.      Objective    BP 97/65 (BP Location: Right arm, Patient Position: Sitting, Cuff Size: Adult Regular)   Pulse 91   Temp 97.9  F (36.6  C) (Tympanic)   Resp 18   SpO2 98%   There is no height or weight on file to calculate BMI.  Physical Exam  Constitutional:       General: She is not in acute distress.     Appearance: She is well-developed.   Psychiatric:         Behavior: Behavior normal.

## 2022-07-18 RX ORDER — VALACYCLOVIR HYDROCHLORIDE 1 G/1
TABLET, FILM COATED ORAL EVERY 12 HOURS
COMMUNITY
End: 2022-07-19

## 2022-07-19 ENCOUNTER — ALLIED HEALTH/NURSE VISIT (OUTPATIENT)
Dept: FAMILY MEDICINE | Facility: CLINIC | Age: 67
End: 2022-07-19

## 2022-07-19 ENCOUNTER — OFFICE VISIT (OUTPATIENT)
Dept: FAMILY MEDICINE | Facility: CLINIC | Age: 67
End: 2022-07-19
Payer: MEDICARE

## 2022-07-19 VITALS
SYSTOLIC BLOOD PRESSURE: 95 MMHG | HEART RATE: 95 BPM | BODY MASS INDEX: 21.26 KG/M2 | OXYGEN SATURATION: 97 % | DIASTOLIC BLOOD PRESSURE: 65 MMHG | RESPIRATION RATE: 16 BRPM | WEIGHT: 120 LBS

## 2022-07-19 DIAGNOSIS — F41.1 GENERALIZED ANXIETY DISORDER: ICD-10-CM

## 2022-07-19 DIAGNOSIS — F20.3 UNDIFFERENTIATED SCHIZOPHRENIA (H): Primary | ICD-10-CM

## 2022-07-19 DIAGNOSIS — C43.70 MALIGNANT MELANOMA OF SKIN OF LOWER EXTREMITY, INCLUDING HIP, UNSPECIFIED LATERALITY (H): ICD-10-CM

## 2022-07-19 DIAGNOSIS — F32.A DEPRESSION: ICD-10-CM

## 2022-07-19 DIAGNOSIS — Z21 HIV INFECTION, UNSPECIFIED SYMPTOM STATUS (H): ICD-10-CM

## 2022-07-19 DIAGNOSIS — F21 SCHIZOTYPAL DISORDER (H): ICD-10-CM

## 2022-07-19 PROCEDURE — 99215 OFFICE O/P EST HI 40 MIN: CPT | Performed by: FAMILY MEDICINE

## 2022-07-19 PROCEDURE — 99417 PROLNG OP E/M EACH 15 MIN: CPT | Performed by: FAMILY MEDICINE

## 2022-07-19 PROCEDURE — 99207 PR NO CHARGE NURSE ONLY: CPT

## 2022-07-19 SDOH — ECONOMIC STABILITY: TRANSPORTATION INSECURITY
IN THE PAST 12 MONTHS, HAS LACK OF TRANSPORTATION KEPT YOU FROM MEETINGS, WORK, OR FROM GETTING THINGS NEEDED FOR DAILY LIVING?: NO

## 2022-07-19 SDOH — ECONOMIC STABILITY: TRANSPORTATION INSECURITY
IN THE PAST 12 MONTHS, HAS THE LACK OF TRANSPORTATION KEPT YOU FROM MEDICAL APPOINTMENTS OR FROM GETTING MEDICATIONS?: NO

## 2022-07-19 ASSESSMENT — SOCIAL DETERMINANTS OF HEALTH (SDOH): HOW HARD IS IT FOR YOU TO PAY FOR THE VERY BASICS LIKE FOOD, HOUSING, MEDICAL CARE, AND HEATING?: SOMEWHAT HARD

## 2022-07-19 NOTE — COMMUNITY RESOURCES LIST (ENGLISH)
07/19/2022   [facility] - Outpatient Clinics  N/A  For questions about this resource list or additional care needs, please contact your primary care clinic or care manager.  Phone: [phone]   Email: N/A   Address: [address] [place] [id number]   Hours: N/A        Mental Health       Individual counseling  1  [facility] Dialectical Behavior Therapy (DBT) Distance: 1.17 miles      COVID-19 Status: Regular Operations, COVID-19 Status: Phone/Virtual   [address] [facility] [address] [place] [address]  Language: English  Hours: Mon - Thu 9:00 AM - 7:00 PM , Fri 9:00 AM - 5:00 PM  Fees: Insurance, Self Pay   Phone: (662) 555-1316 Email: info@[employer] Website: http://www.[employer]/     2  [facility] Distance: 1.48 miles      COVID-19 Status: Regular Operations, COVID-19 Status: Phone/Virtual   [address] [place] [address]  Language: English, Hmong, Cypriot  Hours: Mon 8:00 AM - 8:00 PM , Tue 8:00 AM - 5:00 PM , Wed - Thu 8:00 AM - 8:00 PM , Fri 8:00 AM - 5:00 PM , Sat 8:00 AM - 12:00 PM  Fees: Insurance, Self Pay, Sliding Fee   Phone: (993) 528-7429 Website: https://www.[place]care.org     Mental health support group  3  [facility] Distance: 2.58 miles      COVID-19 Status: Phone/Virtual   [address] [address]  Language: English  Hours: Mon - Fri 8:00 AM - 4:30 PM  Fees: Free   Phone: (182) 243-9035 Website: https://www.va.gov/find-locations/facility/vc_0416V     4  [facility] Distance: 3.75 miles      COVID-19 Status: Phone/Virtual   [address] [address]  Language: English  Hours: Mon - Thu 7:00 AM - 8:00 PM , Fri 7:00 AM - 5:00 PM  Fees: Insurance, Self Pay   Phone: (249) 597-5051 Email: contactus@[employer] Website: https://www.[employer]/our-locations/[place]/[facility]/          Important Numbers &  Websites       Emergency Services   [address]  [place] [address]  Poison Control   (393) 499-8199  Suicide Prevention Lifeline   (486) 632-7028 (TALK)  Child Abuse Hotline   (647) 945-3348 (4-A-Child)  Sexual Assault Hotline   (730) 507-1607 (HOPE)  National Runaway Safeline   (218) 896-2458 (RUNAWAY)  All-Options Talkline   (497) 975-8442  Substance Abuse Referral   (360) 445-9547 (HELP)

## 2022-07-19 NOTE — COMMUNITY RESOURCES LIST (ENGLISH)
07/19/2022   [facility] - Outpatient Clinics  N/A  For questions about this resource list or additional care needs, please contact your primary care clinic or care manager.  Phone: [phone]   Email: N/A   Address: [address] [place] [address]   Hours: N/A        Mental Health       Individual counseling  1  [facility] Dialectical Behavior Therapy (DBT) Distance: 1.17 miles      COVID-19 Status: Regular Operations, COVID-19 Status: Phone/Virtual   [address] [facility] [address] [place] [address]  Language: English  Hours: Mon - Thu 9:00 AM - 7:00 PM , Fri 9:00 AM - 5:00 PM  Fees: Insurance, Self Pay   Phone: (990) 296-4148 Email: info@[employer] Website: http://www.[employer]/     2  [facility] Distance: 1.48 miles      COVID-19 Status: Regular Operations, COVID-19 Status: Phone/Virtual   [address] [place] [address]  Language: English, Hmong, Kosovan  Hours: Mon 8:00 AM - 8:00 PM , Tue 8:00 AM - 5:00 PM , Wed - Thu 8:00 AM - 8:00 PM , Fri 8:00 AM - 5:00 PM , Sat 8:00 AM - 12:00 PM  Fees: Insurance, Self Pay, Sliding Fee   Phone: (148) 422-8504 Website: https://www.[place]care.org     Mental health support group  3  [employer] Distance: 2.58 miles      COVID-19 Status: Phone/Virtual   [address] [address]  Language: English  Hours: Mon - Fri 8:00 AM - 4:30 PM  Fees: Free   Phone: (801) 725-7248 Website: https://www.va.gov/find-locations/facility/vc_0416V     4  [facility] Distance: 3.75 miles      COVID-19 Status: Phone/Virtual   [address] [address]  Language: English  Hours: Mon - Thu 7:00 AM - 8:00 PM , Fri 7:00 AM - 5:00 PM  Fees: Insurance, Self Pay   Phone: (294) 446-3953 Email: contactus@[employer] Website: https://www.[employer]/our-locations/[place]/[facility]/          Important Numbers &  Websites       Emergency Services   [address]  [place] [address]  Poison Control   (903) 738-5176  Suicide Prevention Lifeline   (203) 575-7542 (TALK)  Child Abuse Hotline   (783) 482-1508 (4-A-Child)  Sexual Assault Hotline   (986) 647-8409 (HOPE)  National Runaway Safeline   (631) 356-3854 (RUNAWAY)  All-Options Talkline   (624) 499-3867  Substance Abuse Referral   (775) 150-7996 (HELP)
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
## 2022-07-19 NOTE — PATIENT INSTRUCTIONS
Patient to follow-up with PCP and clinic therapist/Director of Behavioral Health in one week.    Patient to follow care plan as outlined and agreed to with PCP this date.

## 2022-07-27 ENCOUNTER — OFFICE VISIT (OUTPATIENT)
Dept: FAMILY MEDICINE | Facility: CLINIC | Age: 67
End: 2022-07-27
Payer: MEDICARE

## 2022-07-27 VITALS
OXYGEN SATURATION: 96 % | TEMPERATURE: 98.4 F | BODY MASS INDEX: 20.32 KG/M2 | WEIGHT: 119 LBS | DIASTOLIC BLOOD PRESSURE: 61 MMHG | HEIGHT: 64 IN | RESPIRATION RATE: 18 BRPM | HEART RATE: 103 BPM | SYSTOLIC BLOOD PRESSURE: 92 MMHG

## 2022-07-27 DIAGNOSIS — B00.9 HERPES SIMPLEX VIRUS INFECTION: Primary | ICD-10-CM

## 2022-07-27 DIAGNOSIS — Z21 HIV INFECTION, UNSPECIFIED SYMPTOM STATUS (H): ICD-10-CM

## 2022-07-27 DIAGNOSIS — F20.3 UNDIFFERENTIATED SCHIZOPHRENIA (H): ICD-10-CM

## 2022-07-27 PROCEDURE — 99204 OFFICE O/P NEW MOD 45 MIN: CPT | Mod: 25

## 2022-07-27 PROCEDURE — 90746 HEPB VACCINE 3 DOSE ADULT IM: CPT

## 2022-07-27 PROCEDURE — G0010 ADMIN HEPATITIS B VACCINE: HCPCS

## 2022-07-27 RX ORDER — VALACYCLOVIR HYDROCHLORIDE 1 G/1
1000 TABLET, FILM COATED ORAL 2 TIMES DAILY
Qty: 30 TABLET | Refills: 1 | Status: SHIPPED | OUTPATIENT
Start: 2022-07-27 | End: 2022-07-28
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
## 2022-07-27 NOTE — PROGRESS NOTES
Preceptor Attestation:   Patient seen, evaluated and discussed with the resident. I have verified the content of the note, which accurately reflects my assessment of the patient and the plan of care.  Supervising Physician:Kyle Anthony MD  [facility]

## 2022-07-28 DIAGNOSIS — B00.9 HERPES SIMPLEX VIRUS INFECTION: ICD-10-CM

## 2022-07-28 RX ORDER — VALACYCLOVIR HYDROCHLORIDE 1 G/1
1000 TABLET, FILM COATED ORAL 2 TIMES DAILY
Qty: 60 TABLET | Refills: 11 | Status: SHIPPED | OUTPATIENT
Start: 2022-07-28 | End: 2023-11-13

## 2022-07-28 NOTE — TELEPHONE ENCOUNTER
Patient called in to request that we resend her prescription for valacyclovir to [facility] in [place].  Pt state that the medication we sent over does not look like the medication she is use to. Pt stated that the pills are white and have a number on them instead of blue. I explained to the patient that we do not have any control on the  of the medications and we also do not have control of what   the pharmacy gets there products from.  I educated the patient on how the same type of medication can come in different colors and maybe sizes depending on the . Patient was frustrated and requested we send a new prescription to the [facility] in [place] because she has gotten the blue colored pills from them before. I did suggest to the patient that she call the pharmacy before driving up there to make sure they have the correct pill and also to see if the insurance will cover the prescription since it was just filled at [facility].

## 2022-10-12 ENCOUNTER — TELEPHONE (OUTPATIENT)
Dept: INFECTIOUS DISEASES | Facility: CLINIC | Age: 67
End: 2022-10-12

## 2022-10-12 DIAGNOSIS — Z21 ASYMPTOMATIC HUMAN IMMUNODEFICIENCY VIRUS (HIV) INFECTION STATUS (H): ICD-10-CM

## 2022-10-12 NOTE — TELEPHONE ENCOUNTER
Pt needs a new rx sent in for Biktarvy, there are refills but  the RX has . She is supposed to fill the rx on the .  Rx needs to go to [facility] pharmacy through [employer], [phone].    Pt did made a follow up appt with  for 10/25.

## 2022-10-13 RX ORDER — BICTEGRAVIR SODIUM, EMTRICITABINE, AND TENOFOVIR ALAFENAMIDE FUMARATE 50; 200; 25 MG/1; MG/1; MG/1
1 TABLET ORAL DAILY
Qty: 90 TABLET | Refills: 1 | Status: SHIPPED | OUTPATIENT
Start: 2022-10-13 | End: 2023-09-13 | Stop reason: SINTOL

## 2022-10-18 ENCOUNTER — TELEPHONE (OUTPATIENT)
Dept: FAMILY MEDICINE | Facility: CLINIC | Age: 67
End: 2022-10-18

## 2022-10-18 NOTE — TELEPHONE ENCOUNTER
[facility] phone call message- general phone call:    Reason for call: Patient called and left message on medical records line. Stating she wants to  regarding appointments and etc. Please call and advise.     Return call needed: Yes    OK to leave a message on voice mail? Yes    Primary language: English      needed? No    Call taken on October 18, 2022 at 2:31 PM by Margoth Monreal

## 2022-10-25 ENCOUNTER — LAB (OUTPATIENT)
Dept: LAB | Facility: CLINIC | Age: 67
End: 2022-10-25
Payer: MEDICARE

## 2022-10-25 ENCOUNTER — OFFICE VISIT (OUTPATIENT)
Dept: INFECTIOUS DISEASES | Facility: CLINIC | Age: 67
End: 2022-10-25
Payer: MEDICARE

## 2022-10-25 ENCOUNTER — MEDICAL CORRESPONDENCE (OUTPATIENT)
Dept: INFECTIOUS DISEASES | Facility: CLINIC | Age: 67
End: 2022-10-25

## 2022-10-25 VITALS
HEART RATE: 90 BPM | DIASTOLIC BLOOD PRESSURE: 62 MMHG | WEIGHT: 120 LBS | OXYGEN SATURATION: 95 % | SYSTOLIC BLOOD PRESSURE: 100 MMHG | BODY MASS INDEX: 20.6 KG/M2

## 2022-10-25 DIAGNOSIS — Z21 ASYMPTOMATIC HUMAN IMMUNODEFICIENCY VIRUS (HIV) INFECTION STATUS (H): ICD-10-CM

## 2022-10-25 DIAGNOSIS — Z21 ASYMPTOMATIC HUMAN IMMUNODEFICIENCY VIRUS (HIV) INFECTION STATUS (H): Primary | ICD-10-CM

## 2022-10-25 LAB
ALBUMIN SERPL BCG-MCNC: 4.3 G/DL (ref 3.5–5.2)
ALP SERPL-CCNC: 70 U/L (ref 35–104)
ALT SERPL W P-5'-P-CCNC: 19 U/L (ref 10–35)
ANION GAP SERPL CALCULATED.3IONS-SCNC: 8 MMOL/L (ref 7–15)
AST SERPL W P-5'-P-CCNC: 27 U/L (ref 10–35)
BASOPHILS # BLD AUTO: 0 10E3/UL (ref 0–0.2)
BASOPHILS NFR BLD AUTO: 0 %
BILIRUB SERPL-MCNC: 0.3 MG/DL
BUN SERPL-MCNC: 20.8 MG/DL (ref 8–23)
CALCIUM SERPL-MCNC: 9.8 MG/DL (ref 8.8–10.2)
CHLORIDE SERPL-SCNC: 104 MMOL/L (ref 98–107)
CREAT SERPL-MCNC: 0.75 MG/DL (ref 0.51–0.95)
DEPRECATED HCO3 PLAS-SCNC: 29 MMOL/L (ref 22–29)
EOSINOPHIL # BLD AUTO: 0.5 10E3/UL (ref 0–0.7)
EOSINOPHIL NFR BLD AUTO: 8 %
ERYTHROCYTE [DISTWIDTH] IN BLOOD BY AUTOMATED COUNT: 13.8 % (ref 10–15)
GFR SERPL CREATININE-BSD FRML MDRD: 88 ML/MIN/1.73M2
GLUCOSE SERPL-MCNC: 100 MG/DL (ref 70–99)
HCT VFR BLD AUTO: 41.5 % (ref 35–47)
HGB BLD-MCNC: 13.9 G/DL (ref 11.7–15.7)
IMM GRANULOCYTES # BLD: 0 10E3/UL
IMM GRANULOCYTES NFR BLD: 0 %
LYMPHOCYTES # BLD AUTO: 3.2 10E3/UL (ref 0.8–5.3)
LYMPHOCYTES NFR BLD AUTO: 51 %
MCH RBC QN AUTO: 29.3 PG (ref 26.5–33)
MCHC RBC AUTO-ENTMCNC: 33.5 G/DL (ref 31.5–36.5)
MCV RBC AUTO: 87 FL (ref 78–100)
MONOCYTES # BLD AUTO: 0.5 10E3/UL (ref 0–1.3)
MONOCYTES NFR BLD AUTO: 9 %
NEUTROPHILS # BLD AUTO: 2 10E3/UL (ref 1.6–8.3)
NEUTROPHILS NFR BLD AUTO: 32 %
PLATELET # BLD AUTO: 227 10E3/UL (ref 150–450)
POTASSIUM SERPL-SCNC: 4.1 MMOL/L (ref 3.4–5.3)
PROT SERPL-MCNC: 7.2 G/DL (ref 6.4–8.3)
RBC # BLD AUTO: 4.75 10E6/UL (ref 3.8–5.2)
SODIUM SERPL-SCNC: 141 MMOL/L (ref 136–145)
WBC # BLD AUTO: 6.3 10E3/UL (ref 4–11)

## 2022-10-25 PROCEDURE — 86359 T CELLS TOTAL COUNT: CPT

## 2022-10-25 PROCEDURE — 85025 COMPLETE CBC W/AUTO DIFF WBC: CPT

## 2022-10-25 PROCEDURE — 99215 OFFICE O/P EST HI 40 MIN: CPT | Performed by: INTERNAL MEDICINE

## 2022-10-25 PROCEDURE — 36415 COLL VENOUS BLD VENIPUNCTURE: CPT

## 2022-10-25 PROCEDURE — 86360 T CELL ABSOLUTE COUNT/RATIO: CPT

## 2022-10-25 PROCEDURE — 87536 HIV-1 QUANT&REVRSE TRNSCRPJ: CPT

## 2022-10-25 PROCEDURE — 80053 COMPREHEN METABOLIC PANEL: CPT

## 2022-10-25 NOTE — PROGRESS NOTES
INF[facility]IOUS DISEASE [place] [facility] FOLLOW UP NOTE      Date: 10/25/2022   Patient Name: Norma Sidhu   YOB: 1955  MRN: [id number]      ASSESSMENT:  Norma was seen today for follow up.    Diagnoses and all orders for this visit:    Asymptomatic human immunodeficiency virus (HIV) infection status (H): she struggles with the diagnosis. Improved VL when taking biktarvy but poor compliance with medication. Extensive discussion with Norma about her HIV diagnosis, U=U, importance of medication compliance. Her mental health and housing issues are barriers.   -     Creatinine; Future  -     HIV-1 RNA quantitative; Future  -     CBC with platelets; Future    Julia Perez MD  [facility]   Clinic phone: [phone]   Clinic fax: [phone]    ______________________________________________________________________    SUBJECTIVE / INTERVAL HISTORY: Norma Sidhu returns for follow up of HIV.    She actually last saw me at [facility] this spring. At that time, reported being on meds for a few days and had an undetectable VL. She states that since her VL was undetectable, she doesn't think that she has HIV anymore. She threw out some pills and gave another bottle to a family member who is a nurse. States that she started taking her medications again around 4-6 weeks ago. She is moving to a new apartment but is worried that it's a trap and says she'll need referrals to new providers in that area. Spends most of visit concerned about people chasing her down and is very paranoid. She states that no medications help her. Saw primary this summer and reluctant to restart medications for schizophrenia.      ROS: All other systems negative except as listed above.      Current Outpatient Medications:      bictegravir-emtricitabine-tenofovir (BIKTARVY) -25 MG per tablet, Take 1 tablet by mouth daily, Disp: 90 tablet, Rfl: 1     valACYclovir (VALTREX) 1000 mg  tablet, Take 1 tablet (1,000 mg) by mouth 2 times daily (Patient not taking: Reported on 10/25/2022), Disp: 60 tablet, Rfl: 11      OBJECTIVE:  /62 (BP Location: Right arm, Patient Position: Sitting, Cuff Size: Adult Regular)   Pulse 90   Wt 54.4 kg (120 lb)   SpO2 95%   BMI 20.60 kg/m        GEN: No acute distress. Pressure speech.   RESPIRATORY:  Normal breathing pattern.   CARDIOVASCULAR:  Regular  EXTREMITIES: No edema.  SKIN/HAIR/NAILS:  No rashes      Pertinent labs:    No results found for: CRP  Last Comprehensive Metabolic Panel:  Sodium   Date Value Ref Range Status   09/28/2021 142 136 - 145 mmol/L Final   08/13/2011 141 133 - 144 mmol/L Final     Potassium   Date Value Ref Range Status   09/28/2021 4.4 3.5 - 5.0 mmol/L Final   08/13/2011 3.9 3.4 - 5.3 mmol/L Final     Chloride   Date Value Ref Range Status   09/28/2021 106 98 - 107 mmol/L Final   08/13/2011 101 94 - 109 mmol/L Final     Carbon Dioxide   Date Value Ref Range Status   08/13/2011 30 20 - 32 mmol/L Final     Carbon Dioxide (CO2)   Date Value Ref Range Status   09/28/2021 27 22 - 31 mmol/L Final     Anion Gap   Date Value Ref Range Status   09/28/2021 9 5 - 18 mmol/L Final   08/13/2011 10.2 6 - 17 mmol/L Final     Glucose   Date Value Ref Range Status   09/28/2021 87 70 - 125 mg/dL Final   08/13/2011 80 60 - 99 mg/dL Final     Urea Nitrogen   Date Value Ref Range Status   09/28/2021 15 8 - 22 mg/dL Final   08/13/2011 12 7 - 30 mg/dL Final     Creatinine   Date Value Ref Range Status   09/28/2021 0.76 0.60 - 1.10 mg/dL Final   08/13/2011 0.70 0.52 - 1.04 mg/dL Final     GFR Estimate   Date Value Ref Range Status   09/28/2021 83 >60 mL/min/1.73m2 Final     Comment:     As of July 11, 2021, eGFR is calculated by the CKD-EPI creatinine equation, without race adjustment. eGFR can be influenced by muscle mass, exercise, and diet. The reported eGFR is an estimation only and is only applicable if the renal function is stable.   08/13/2011 87  >60 mL/min/1.7m2 Final     Calcium   Date Value Ref Range Status   09/28/2021 9.2 8.5 - 10.5 mg/dL Final   08/13/2011 9.3 8.5 - 10.4 mg/dL Final     CBC RESULTS:   Recent Labs   Lab Test 09/28/21  1441   WBC 4.9   RBC 4.65   HGB 13.5   HCT 40.0   MCV 86   MCH 29.0   MCHC 33.8   RDW 14.2          MICROBIOLOGY DATA:    T Cell Subset:  Absolute CD4, [place] T Cells   Date Value Ref Range Status   09/28/2021 490 441-2,156 cells/uL Final     CD4% [place] T Cells   Date Value Ref Range Status   09/28/2021 19 (L) 28 - 63 % Final     CD8% Suppressor T Cells   Date Value Ref Range Status   09/28/2021 66 (H) 10 - 40 % Final     CD3% Total T Cells   Date Value Ref Range Status   09/28/2021 84 49 - 84 % Final     CD4:CD8 Ratio   Date Value Ref Range Status   09/28/2021 0.28 (L) 1.40 - 2.60 Final     WBC   Date Value Ref Range Status   08/13/2011 5.1 4.0 - 11.0 10e9/L Final     WBC Count   Date Value Ref Range Status   09/28/2021 4.9 4.0 - 11.0 10e3/uL Final     % Lymphocytes   Date Value Ref Range Status   09/28/2021 53 % Final   08/13/2011 32.9 20 - 48 % Final     Absolute CD3, Total T Cells   Date Value Ref Range Status   09/28/2021 2,220 603-2,990 cells/uL Final     Absolute CD8, Suppressor T Cells   Date Value Ref Range Status   09/28/2021 1,732 (H) 125-1,312 cells/uL Final       HIV-1 RNA Quantitative:  No results found for: HIQT   Undetectable this past spring at [facility]    RADIOLOGY:    No results found for this or any previous visit (from the past 744 hour(s)).     Total Time Spent 45 minutes including chart review, time with patient, orders, and documentation.

## 2022-10-26 LAB
CD3 CELLS # BLD: 2650 CELLS/UL (ref 603–2990)
CD3 CELLS NFR BLD: 80 % (ref 49–84)
CD3+CD4+ CELLS # BLD: 780 CELLS/UL (ref 441–2156)
CD3+CD4+ CELLS NFR BLD: 24 % (ref 28–63)
CD3+CD4+ CELLS/CD3+CD8+ CLL BLD: 0.42 % (ref 1.4–2.6)
CD3+CD8+ CELLS # BLD: 1844 CELLS/UL (ref 125–1312)
CD3+CD8+ CELLS NFR BLD: 56 % (ref 10–40)
T CELL COMMENT: ABNORMAL

## 2022-10-27 LAB — HIV1 RNA # PLAS NAA DL=20: NOT DETECTED COPIES/ML

## 2023-01-12 ENCOUNTER — TELEPHONE (OUTPATIENT)
Dept: INFECTIOUS DISEASES | Facility: CLINIC | Age: 68
End: 2023-01-12

## 2023-01-12 NOTE — TELEPHONE ENCOUNTER
fyi- Patient called. She wanted lab results from last year from her visits with both [facility] and [facility]. She said that she filled out a form with [facility], but was told she can't get the lab results. I informed her we're not [facility] and she needs to contact them about the release of information with them.   I was going to offer to have her speak with [facility] Medical records, but she got angry and said that she's going to report us to someone about not releasing her lab results even though she filled out the release with [facility].

## 2023-01-12 NOTE — TELEPHONE ENCOUNTER
Nurse Ashlee called from [facility], pt had also called there to get some records. I relayed the below message to her. She will contact patient and help her get the [facility] records, I provided our [facility] medical records number to her to give to the patient to request copies of her medical records.

## 2023-01-19 ENCOUNTER — LAB REQUISITION (OUTPATIENT)
Dept: LAB | Facility: CLINIC | Age: 68
End: 2023-01-19
Payer: MEDICARE

## 2023-01-19 DIAGNOSIS — Z01.419 ENCOUNTER FOR GYNECOLOGICAL EXAMINATION (GENERAL) (ROUTINE) WITHOUT ABNORMAL FINDINGS: ICD-10-CM

## 2023-01-19 PROCEDURE — G0145 SCR C/V CYTO,THINLAYER,RESCR: HCPCS | Performed by: OBSTETRICS & GYNECOLOGY

## 2023-01-19 PROCEDURE — 87624 HPV HI-RISK TYP POOLED RSLT: CPT | Mod: ORL | Performed by: OBSTETRICS & GYNECOLOGY

## 2023-01-19 PROCEDURE — 87624 HPV HI-RISK TYP POOLED RSLT: CPT | Performed by: OBSTETRICS & GYNECOLOGY

## 2023-01-24 LAB
BKR LAB AP GYN ADEQUACY: NORMAL
BKR LAB AP GYN INTERPRETATION: NORMAL
BKR LAB AP HPV REFLEX: NORMAL
BKR LAB AP LMP: NORMAL
BKR LAB AP PREVIOUS ABNL DX: NORMAL
BKR LAB AP PREVIOUS ABNORMAL: NORMAL
PATH REPORT.COMMENTS IMP SPEC: NORMAL
PATH REPORT.COMMENTS IMP SPEC: NORMAL
PATH REPORT.RELEVANT HX SPEC: NORMAL

## 2023-01-25 LAB
HUMAN PAPILLOMA VIRUS 16 DNA: NEGATIVE
HUMAN PAPILLOMA VIRUS 18 DNA: NEGATIVE
HUMAN PAPILLOMA VIRUS FINAL DIAGNOSIS: ABNORMAL
HUMAN PAPILLOMA VIRUS OTHER HR: POSITIVE

## 2023-04-23 ENCOUNTER — OFFICE VISIT (OUTPATIENT)
Dept: URGENT CARE | Facility: URGENT CARE | Age: 68
End: 2023-04-23
Payer: MEDICARE

## 2023-04-23 ENCOUNTER — ANCILLARY PROCEDURE (OUTPATIENT)
Dept: GENERAL RADIOLOGY | Facility: CLINIC | Age: 68
End: 2023-04-23
Attending: PHYSICIAN ASSISTANT
Payer: MEDICARE

## 2023-04-23 VITALS
SYSTOLIC BLOOD PRESSURE: 90 MMHG | BODY MASS INDEX: 20.77 KG/M2 | HEART RATE: 99 BPM | TEMPERATURE: 97.6 F | DIASTOLIC BLOOD PRESSURE: 55 MMHG | OXYGEN SATURATION: 97 % | WEIGHT: 121 LBS

## 2023-04-23 DIAGNOSIS — R05.1 ACUTE COUGH: ICD-10-CM

## 2023-04-23 DIAGNOSIS — R05.1 ACUTE COUGH: Primary | ICD-10-CM

## 2023-04-23 PROCEDURE — 99213 OFFICE O/P EST LOW 20 MIN: CPT | Performed by: PHYSICIAN ASSISTANT

## 2023-04-23 PROCEDURE — 71046 X-RAY EXAM CHEST 2 VIEWS: CPT | Mod: TC | Performed by: RADIOLOGY

## 2023-04-23 NOTE — PROGRESS NOTES
Assessment & Plan     Acute cough  Reassurance, chest x-ray is clear. Patient declines inhaler or tessalon pearls at this time. Return to clinic if symptoms worsen or do not improve; otherwise follow up as needed      - XR Chest 2 Views; Future                 Return in about 1 week (around 4/30/2023), or if symptoms worsen or fail to improve.    WILBER [facility]                Subjective   Chief Complaint   Patient presents with     Cough     Dry, unproductive cough.Says gets tired quickly. Been going on for 10 days.          HPI     URI Adult    Onset of symptoms was 10 day(s) ago.  Course of illness is waxing and waning.    Severity moderate  Current and Associated symptoms: cough  Treatment measures tried include cough drops.  Predisposing factors include None.                Review of Systems   Constitutional, HEENT, cardiovascular, pulmonary, gi and gu systems are negative, except as otherwise noted.      Objective    BP 90/55   Pulse 99   Temp 97.6  F (36.4  C) (Tympanic)   Wt 54.9 kg (121 lb)   SpO2 97%   BMI 20.77 kg/m    Body mass index is 20.77 kg/m .  Physical Exam  Constitutional:       Appearance: She is well-developed.   HENT:      Head: Normocephalic.      Right Ear: Tympanic membrane and ear canal normal.      Left Ear: Tympanic membrane and ear canal normal.   Eyes:      Conjunctiva/sclera: Conjunctivae normal.   Cardiovascular:      Rate and Rhythm: Normal rate.      Heart sounds: Normal heart sounds.   Pulmonary:      Effort: Pulmonary effort is normal.      Breath sounds: Normal breath sounds.   Skin:     General: Skin is warm and dry.      Findings: No rash.   Psychiatric:         Behavior: Behavior normal.            Xray - Reviewed and interpreted by me.  No acute infiltrates

## 2023-05-23 ENCOUNTER — OFFICE VISIT (OUTPATIENT)
Dept: FAMILY MEDICINE | Facility: CLINIC | Age: 68
End: 2023-05-23
Payer: MEDICARE

## 2023-05-23 VITALS
WEIGHT: 121 LBS | HEIGHT: 64 IN | DIASTOLIC BLOOD PRESSURE: 59 MMHG | HEART RATE: 69 BPM | SYSTOLIC BLOOD PRESSURE: 96 MMHG | BODY MASS INDEX: 20.66 KG/M2 | RESPIRATION RATE: 18 BRPM | TEMPERATURE: 97.8 F | OXYGEN SATURATION: 99 %

## 2023-05-23 DIAGNOSIS — F20.3 UNDIFFERENTIATED SCHIZOPHRENIA (H): ICD-10-CM

## 2023-05-23 DIAGNOSIS — Z21 ASYMPTOMATIC HUMAN IMMUNODEFICIENCY VIRUS (HIV) INFECTION STATUS (H): ICD-10-CM

## 2023-05-23 DIAGNOSIS — C43.70 MALIGNANT MELANOMA OF SKIN OF LOWER EXTREMITY, INCLUDING HIP, UNSPECIFIED LATERALITY (H): ICD-10-CM

## 2023-05-23 PROCEDURE — 99214 OFFICE O/P EST MOD 30 MIN: CPT | Performed by: FAMILY MEDICINE

## 2023-05-23 RX ORDER — ESTRADIOL 0.1 MG/G
CREAM VAGINAL
COMMUNITY
Start: 2023-01-19 | End: 2024-01-16
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
## 2023-05-25 ENCOUNTER — OFFICE VISIT (OUTPATIENT)
Dept: FAMILY MEDICINE | Facility: CLINIC | Age: 68
End: 2023-05-25
Payer: MEDICARE

## 2023-05-25 ENCOUNTER — MEDICAL CORRESPONDENCE (OUTPATIENT)
Dept: HEALTH INFORMATION MANAGEMENT | Facility: CLINIC | Age: 68
End: 2023-05-25

## 2023-05-25 VITALS
SYSTOLIC BLOOD PRESSURE: 93 MMHG | BODY MASS INDEX: 20.78 KG/M2 | DIASTOLIC BLOOD PRESSURE: 64 MMHG | HEART RATE: 91 BPM | OXYGEN SATURATION: 100 % | WEIGHT: 121.08 LBS

## 2023-05-25 DIAGNOSIS — L57.0 ACTINIC KERATOSIS: Primary | ICD-10-CM

## 2023-05-25 PROCEDURE — 17000 DESTRUCT PREMALG LESION: CPT | Performed by: FAMILY MEDICINE

## 2023-05-25 NOTE — PROGRESS NOTES
Patient presents with:  Procedure: Want to see if will have biopsy done, per [facility] should be biopsied       Assessment & Plan   Problem List Items Addressed This Visit    None         13 minutes spent by me on the date of the encounter doing chart review, history and exam, documentation and further activities per the note       MEDICATIONS:  Continue current medications without change  Regular exercise  See Patient Instructions    No follow-ups on file.    Kyle Anthony MD  [facility] [place] Subjective Wendy is a 67 year old, presenting for the following health issues:  Procedure (Want to see if will have biopsy done, per [facility] should be biopsied )        5/25/2023    11:30 AM   Additional Questions   Roomed by van     HPI       A 67-year-old woman in for possible removal of a skin lesion on her forehead on the recommendation of Dr. Ji.    On exam today, she has a small 1-2 mm diameter area of redness that is slightly raised but does not appear pigmented.  With the dermatoscope, it appears there is 1 enlarged vessel and no abnormal-looking cells and no pigmentation.  The remaining of the skin on her face and forehead appears to have sun-damaged skin and I think that we are working with a possible actinic keratosis at this site.  We talked about biopsy versus observing versus freezing with liquid nitrogen and we settled on freezing with liquid nitrogen x3.  The patient tolerated the procedure well.  If it does not look like normal skin in a month, she should be rechecked and we will consider a biopsy.  I would also consider a 5-FU treatment to remove the sun-damaged skin.    I have recommended she follow up with Dr. Ji if it does not look like normal.    The patient involved in medical decision making throughout the visit.          Objective    BP 93/64 (BP Location: Left arm, Patient Position: Sitting, Cuff Size: Adult Regular)   Pulse 91   Wt 54.9 kg (121 lb 1.3 oz)    SpO2 100%   BMI 20.78 kg/m    Body mass index is 20.78 kg/m .  Physical Exam

## 2023-07-21 ENCOUNTER — TELEPHONE (OUTPATIENT)
Dept: FAMILY MEDICINE | Facility: CLINIC | Age: 68
End: 2023-07-21
Payer: MEDICARE

## 2023-07-21 NOTE — TELEPHONE ENCOUNTER
[facility] phone call message- general phone call:    Reason for call: Patient is calling because the referral that she was sent to they told her to go see forensic psychologist and that they wont take her. Patient is upset and stating that she did not kill anyone or anthing why are they treating that way. She tried 4x with [place] and she still getting turned away. She is needing a call back from doctor regarding this and what to go about this. Please call and advise, thank you.       Return call needed: Yes    OK to leave a message on voice mail? Yes    Primary language: English      needed? No    Call taken on July 21, 2023 at 2:39 PM by Sil Huynh
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
## 2023-08-30 ENCOUNTER — TELEPHONE (OUTPATIENT)
Dept: FAMILY MEDICINE | Facility: CLINIC | Age: 68
End: 2023-08-30

## 2023-08-30 ENCOUNTER — HOSPITAL ENCOUNTER (EMERGENCY)
Facility: CLINIC | Age: 68
Discharge: HOME | End: 2023-08-30
Payer: MEDICARE

## 2023-08-30 DIAGNOSIS — Z21 HIV INFECTION, UNSPECIFIED SYMPTOM STATUS (H): Primary | ICD-10-CM

## 2023-08-30 NOTE — TELEPHONE ENCOUNTER
[facility] phone call message- general phone call:    Reason for call: Pt is calling from the [facility] lab as she stated Dr. Ji wanted her to get some labs drawn. Patient is requesting that the orders be laced so they can draw her blood. I explain that I will send a message to [facility] Cornejo to have the orders placed. Patient stated that she will come back to the lab tomorrow to have blood drawn    Return call needed: No    OK to leave a message on voice mail?  N/A    Primary language: English      needed? No    Call taken on August 30, 2023 at 1:48 PM by STEVE AMARO CMA
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
## 2023-08-31 ENCOUNTER — PATIENT OUTREACH (OUTPATIENT)
Dept: CARE COORDINATION | Facility: CLINIC | Age: 68
End: 2023-08-31
Payer: MEDICARE

## 2023-08-31 NOTE — PROGRESS NOTES
Clinic Care Coordination Contact  Follow Up Progress Note      Assessment: The pt was recently in the ED, I called to check up on the pt and help the pt setup a ED follow up. The pt was at [place]. I called the pt,but got her vm, so I left a vm for the pt to give me a call back.     Care Gaps:    Health Maintenance Due   Topic Date Due    ADVANCE CARE PLANNING  Never done    MENINGITIS IMMUNIZATION (1 - Risk 2-dose series) Never done    Pneumococcal Vaccine: 65+ Years (1 - PCV) Never done    HEPATITIS C SCREENING  Never done    HEPATITIS A IMMUNIZATION (1 of 2 - Risk 2-dose series) Never done    ZOSTER IMMUNIZATION (1 of 2) 01/07/2017    COLORECTAL CANCER SCREENING  03/31/2017    LIPID  02/12/2020    DTAP/TDAP/TD IMMUNIZATION (3 - Td or Tdap) 02/08/2021    MEDICARE ANNUAL WELLNESS VISIT  11/08/2021    FALL RISK ASSESSMENT  Never done    DEXA  11/15/2021    COVID-19 Vaccine (2 - Moderna series) 03/10/2022    HEPATITIS B IMMUNIZATION (2 of 3 - Risk 3-dose series) 08/24/2022    MAMMO SCREENING  11/25/2022           Care Plans      Intervention/Education provided during outreach:               Plan:     Care Coordinator will follow up in

## 2023-09-01 ENCOUNTER — PATIENT OUTREACH (OUTPATIENT)
Dept: CARE COORDINATION | Facility: CLINIC | Age: 68
End: 2023-09-01

## 2023-09-01 ENCOUNTER — LAB (OUTPATIENT)
Dept: LAB | Facility: CLINIC | Age: 68
End: 2023-09-01
Payer: MEDICARE

## 2023-09-01 ENCOUNTER — TELEPHONE (OUTPATIENT)
Dept: FAMILY MEDICINE | Facility: CLINIC | Age: 68
End: 2023-09-01

## 2023-09-01 DIAGNOSIS — Z21 HIV INFECTION, UNSPECIFIED SYMPTOM STATUS (H): ICD-10-CM

## 2023-09-01 PROCEDURE — 36415 COLL VENOUS BLD VENIPUNCTURE: CPT

## 2023-09-01 PROCEDURE — 87536 HIV-1 QUANT&REVRSE TRNSCRPJ: CPT

## 2023-09-01 NOTE — PROGRESS NOTES
Clinic Care Coordination Contact  Follow Up Progress Note      Assessment: The pt was recently in the ED, I called to check up on the pt and help the pt setup a ED follow up. The pt was at [place]. I called and talked to the pt, pt stated that she went there to get her labs. She will call the clinic, if she needs a follow up.    Care Gaps:    Health Maintenance Due   Topic Date Due    ADVANCE CARE PLANNING  Never done    MENINGITIS IMMUNIZATION (1 - Risk 2-dose series) Never done    Pneumococcal Vaccine: 65+ Years (1 - PCV) Never done    HEPATITIS C SCREENING  Never done    HEPATITIS A IMMUNIZATION (1 of 2 - Risk 2-dose series) Never done    ZOSTER IMMUNIZATION (1 of 2) 01/07/2017    COLORECTAL CANCER SCREENING  03/31/2017    LIPID  02/12/2020    DTAP/TDAP/TD IMMUNIZATION (3 - Td or Tdap) 02/08/2021    MEDICARE ANNUAL WELLNESS VISIT  11/08/2021    FALL RISK ASSESSMENT  Never done    DEXA  11/15/2021    COVID-19 Vaccine (2 - Moderna series) 03/10/2022    HEPATITIS B IMMUNIZATION (2 of 3 - Risk 3-dose series) 08/24/2022    MAMMO SCREENING  11/25/2022    INFLUENZA VACCINE (1) 09/01/2023           Care Plans      Intervention/Education provided during outreach:               Plan:     Care Coordinator will follow up in

## 2023-09-01 NOTE — TELEPHONE ENCOUNTER
[facility] phone call message- general phone call:    Reason for call:     Patient, call and sit a appt with Dr. Ji. Pt would like to inform her that she did not have HPV test done, due to the order was not in and that she would like Dr. Ji to give her a call when the test results comes in. ( HIV test)    Please call pt back with results when it comes in, thank you     Return call needed: Yes    OK to leave a message on voice mail? Yes    Primary language: English      needed? No    Call taken on September 1, 2023 at 8:10 AM by Lilliana Patel

## 2023-09-02 LAB
HIV1 RNA # PLAS NAA DL=20: ABNORMAL COPIES/ML
HIV1 RNA SERPL NAA+PROBE-LOG#: 4.4 {LOG_COPIES}/ML

## 2023-09-06 ENCOUNTER — TELEPHONE (OUTPATIENT)
Dept: INFECTIOUS DISEASES | Facility: CLINIC | Age: 68
End: 2023-09-06
Payer: MEDICARE

## 2023-09-06 NOTE — TELEPHONE ENCOUNTER
Patient called. She is wondering if she can get her lab results from a lab draw that was done at the [facility] by another provider. She said that she tried calling and can't get the results. She also said that the Biktarvi medication is making her not feel good. She also said that it's giving her dementia. She said that the reason why she doesn't want to come in for an appointment is because Dr. Perez doesn't want to change her medication.

## 2023-09-06 NOTE — TELEPHONE ENCOUNTER
Called,  Left message and recommended to call [facility] Thursday afternoon when I am available.      Will try to connect patient back to Infectious disease as well.    Her mental health is impacting her physical condition.    Mariela Ji MD

## 2023-09-07 NOTE — RESULT ENCOUNTER NOTE
Called patient with results. Had to leave message explaining that recent lab testing was again positive and needed to  make appointment with infectious disease.  Did not leave specific details on the phone.  Patient then returned message and stated aware of results and communicated to ID as well has appt also scheduled.

## 2023-09-12 ENCOUNTER — TELEPHONE (OUTPATIENT)
Dept: INFECTIOUS DISEASES | Facility: CLINIC | Age: 68
End: 2023-09-12
Payer: MEDICARE

## 2023-09-12 NOTE — TELEPHONE ENCOUNTER
Pt called and wants to speak with nurse. She had called about lab results and her medication and she did not get a call back. She is scheduled with Dr. Guzman tomorrow because Dr. Perez will not change her medication. If nurse/provider can call her maybe she doesn't need to come in tomorrow. Please call her at [phone]

## 2023-09-12 NOTE — TELEPHONE ENCOUNTER
Kristy called from [facility], pt called them and is getting frustrated cause she can't get through to clinic, patient said that provider had taken her off Biktarvy in May and was going to put her on something new. They need that rx called in. Please call [phone]

## 2023-09-12 NOTE — TELEPHONE ENCOUNTER
This will be discussed at the appt, pt called LM informing she does need the appt and results will be discussed then.

## 2023-09-12 NOTE — TELEPHONE ENCOUNTER
Pt needs to be seen. I spoke to the pt, she stopped Biktarvy and needs an appt to discuss alternatives. She states that Biktarvy caused memory loss. She will keep her appt for tomorrow.

## 2023-09-13 ENCOUNTER — OFFICE VISIT (OUTPATIENT)
Dept: INFECTIOUS DISEASES | Facility: CLINIC | Age: 68
End: 2023-09-13
Payer: MEDICARE

## 2023-09-13 ENCOUNTER — LAB (OUTPATIENT)
Dept: LAB | Facility: CLINIC | Age: 68
End: 2023-09-13
Payer: MEDICARE

## 2023-09-13 VITALS
HEART RATE: 61 BPM | SYSTOLIC BLOOD PRESSURE: 98 MMHG | OXYGEN SATURATION: 98 % | BODY MASS INDEX: 20.2 KG/M2 | TEMPERATURE: 97.9 F | DIASTOLIC BLOOD PRESSURE: 70 MMHG | WEIGHT: 117.7 LBS

## 2023-09-13 DIAGNOSIS — Z21 HIV INFECTION, UNSPECIFIED SYMPTOM STATUS (H): Primary | ICD-10-CM

## 2023-09-13 DIAGNOSIS — Z21 HIV INFECTION, UNSPECIFIED SYMPTOM STATUS (H): ICD-10-CM

## 2023-09-13 LAB
ALBUMIN SERPL BCG-MCNC: 4.6 G/DL (ref 3.5–5.2)
ALP SERPL-CCNC: 67 U/L (ref 35–104)
ALT SERPL W P-5'-P-CCNC: 13 U/L (ref 0–50)
ANION GAP SERPL CALCULATED.3IONS-SCNC: 10 MMOL/L (ref 7–15)
AST SERPL W P-5'-P-CCNC: 25 U/L (ref 0–45)
BASOPHILS # BLD AUTO: 0 10E3/UL (ref 0–0.2)
BASOPHILS NFR BLD AUTO: 1 %
BILIRUB SERPL-MCNC: 0.5 MG/DL
BUN SERPL-MCNC: 12.6 MG/DL (ref 8–23)
CALCIUM SERPL-MCNC: 9.5 MG/DL (ref 8.8–10.2)
CD3 CELLS # BLD: 2161 CELLS/UL (ref 603–2990)
CD3 CELLS NFR BLD: 83 % (ref 49–84)
CD3+CD4+ CELLS # BLD: 626 CELLS/UL (ref 441–2156)
CD3+CD4+ CELLS NFR BLD: 24 % (ref 28–63)
CD3+CD4+ CELLS/CD3+CD8+ CLL BLD: 0.42 % (ref 1.4–2.6)
CD3+CD8+ CELLS # BLD: 1494 CELLS/UL (ref 125–1312)
CD3+CD8+ CELLS NFR BLD: 57 % (ref 10–40)
CHLORIDE SERPL-SCNC: 104 MMOL/L (ref 98–107)
CREAT SERPL-MCNC: 0.66 MG/DL (ref 0.51–0.95)
DEPRECATED HCO3 PLAS-SCNC: 27 MMOL/L (ref 22–29)
EGFRCR SERPLBLD CKD-EPI 2021: >90 ML/MIN/1.73M2
EOSINOPHIL # BLD AUTO: 0.3 10E3/UL (ref 0–0.7)
EOSINOPHIL NFR BLD AUTO: 5 %
ERYTHROCYTE [DISTWIDTH] IN BLOOD BY AUTOMATED COUNT: 12.2 % (ref 10–15)
GLUCOSE SERPL-MCNC: 88 MG/DL (ref 70–99)
HCT VFR BLD AUTO: 43 % (ref 35–47)
HGB BLD-MCNC: 14.7 G/DL (ref 11.7–15.7)
IMM GRANULOCYTES # BLD: 0 10E3/UL
IMM GRANULOCYTES NFR BLD: 0 %
LYMPHOCYTES # BLD AUTO: 2.3 10E3/UL (ref 0.8–5.3)
LYMPHOCYTES NFR BLD AUTO: 39 %
Lab: NORMAL
MCH RBC QN AUTO: 29.6 PG (ref 26.5–33)
MCHC RBC AUTO-ENTMCNC: 34.2 G/DL (ref 31.5–36.5)
MCV RBC AUTO: 87 FL (ref 78–100)
MONOCYTES # BLD AUTO: 0.5 10E3/UL (ref 0–1.3)
MONOCYTES NFR BLD AUTO: 8 %
NEUTROPHILS # BLD AUTO: 2.7 10E3/UL (ref 1.6–8.3)
NEUTROPHILS NFR BLD AUTO: 47 %
PERFORMING LABORATORY: NORMAL
PLATELET # BLD AUTO: 220 10E3/UL (ref 150–450)
POTASSIUM SERPL-SCNC: 4.2 MMOL/L (ref 3.4–5.3)
PROT SERPL-MCNC: 7.4 G/DL (ref 6.4–8.3)
RBC # BLD AUTO: 4.97 10E6/UL (ref 3.8–5.2)
SODIUM SERPL-SCNC: 141 MMOL/L (ref 136–145)
SPECIMEN STATUS: NORMAL
T CELL COMMENT: ABNORMAL
TEST NAME: NORMAL
WBC # BLD AUTO: 5.9 10E3/UL (ref 4–11)

## 2023-09-13 PROCEDURE — 86360 T CELL ABSOLUTE COUNT/RATIO: CPT

## 2023-09-13 PROCEDURE — 80053 COMPREHEN METABOLIC PANEL: CPT

## 2023-09-13 PROCEDURE — 99214 OFFICE O/P EST MOD 30 MIN: CPT | Performed by: INTERNAL MEDICINE

## 2023-09-13 PROCEDURE — 86359 T CELLS TOTAL COUNT: CPT

## 2023-09-13 PROCEDURE — 36415 COLL VENOUS BLD VENIPUNCTURE: CPT

## 2023-09-13 PROCEDURE — 85025 COMPLETE CBC W/AUTO DIFF WBC: CPT

## 2023-09-13 NOTE — PATIENT INSTRUCTIONS
HIV virus was detected in your blood on the last test. Medications like Biktarvy can treat the virus and keep you healthy, but these do not cure the infection.  When the virus is undetectable in blood, it does not leave your body entirely, as it stays in lymph cells and tissue.     When you are not taking the medication, the virus will increase in your blood and eventually lead to your immune system getting more weak and making you sick.     It is important to take the medication every day, because when you start and stop treatment, the virus can become more resistant to medications and make it more difficult to treat.     Triumeq is a good treatment for HIV, but we need to check a blood test to check to see that it is safe for you to take. I will also check the strength of your immune system (CD4 count) and check kidney and liver tests, blood counts, to be sure that these still look good.     If the blood test looks fine from today, I will send Triumeq prescription to your pharmacy.     Return in about 6 weeks. Please let me know if you have concerns or questions in the meantime.     Michael Guzman MD
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
## 2023-09-19 ENCOUNTER — TELEPHONE (OUTPATIENT)
Dept: INFECTIOUS DISEASES | Facility: CLINIC | Age: 68
End: 2023-09-19
Payer: MEDICARE

## 2023-09-19 DIAGNOSIS — Z21 HIV INFECTION, UNSPECIFIED SYMPTOM STATUS (H): Primary | ICD-10-CM

## 2023-09-19 NOTE — TELEPHONE ENCOUNTER
I left a detailed voicemail -- other blood tests look good and CD4 count is 626, a good number.     I let her know that we would expect remaining test result next week and that we will contact her at that time.     Michael Guzman MD

## 2023-09-19 NOTE — TELEPHONE ENCOUNTER
I attempted the pt x 2, she answers and hangs up. I spoke to the lab, the estimated turn around time is 9/24.

## 2023-09-19 NOTE — TELEPHONE ENCOUNTER
----- Message from Diandra Burdick CMA sent at 9/19/2023  8:32 AM CDT -----  Regarding: Lab results  Norma called [facility] yesterday asking for Dr. Ji to call her regarding her lab results from 9/13/23. She said she was told results would be back in 5 days and no one had called her yet. Since the results aren't all in and since we didn't order them, Dr. Ji was wanting someone from your clinic to call the patient and update her please. Let me know if you need anything from our clinic.     Thank you,   Diandra Burdick  Care Coordinator, [facility]

## 2023-09-21 LAB — MISCELLANEOUS TEST 1 (ARUP): NORMAL

## 2023-09-21 NOTE — TELEPHONE ENCOUNTER
Patient is calling for an update on medication she discussed with Dr. Guzman at he last appointment to replace Yesica. What did he decide on? Can it be sent to pharmacy today?

## 2023-09-21 NOTE — TELEPHONE ENCOUNTER
I called the pt and informed that once we have the remaining test result we will contact her and decide on a medication after that. Pt understands.

## 2023-09-22 RX ORDER — ABACAVIR SULFATE, DOLUTEGRAVIR SODIUM, LAMIVUDINE 600; 50; 300 MG/1; MG/1; MG/1
1 TABLET, FILM COATED ORAL DAILY
Qty: 30 TABLET | Refills: 11 | OUTPATIENT
Start: 2023-09-22 | End: 2024-08-08

## 2023-09-25 RX ORDER — ABACAVIR SULFATE, DOLUTEGRAVIR SODIUM, LAMIVUDINE 600; 50; 300 MG/1; MG/1; MG/1
1 TABLET, FILM COATED ORAL DAILY
Qty: 90 TABLET | Refills: 3 | Status: SHIPPED | OUTPATIENT
Start: 2023-09-25 | End: 2024-08-29

## 2023-09-25 NOTE — TELEPHONE ENCOUNTER
Left message for pt- Prescription was written for 90 day supply. Pt would need to contact pharmacy, as it may be an insurance issue.

## 2023-09-25 NOTE — TELEPHONE ENCOUNTER
Patient calling stating Dr. Guzman called and left her a message on Friday 9/22/2023 stating he can now send the medication in for her if she wants it. Per patient she does want the medication. Please call to discuss thank you.     [facility] (Now [facility]) - [place], [place] - [phone]

## 2023-11-13 ENCOUNTER — OFFICE VISIT (OUTPATIENT)
Dept: URGENT CARE | Facility: URGENT CARE | Age: 68
End: 2023-11-13
Payer: MEDICARE

## 2023-11-13 VITALS
DIASTOLIC BLOOD PRESSURE: 56 MMHG | OXYGEN SATURATION: 99 % | TEMPERATURE: 97.8 F | HEART RATE: 102 BPM | BODY MASS INDEX: 20.87 KG/M2 | SYSTOLIC BLOOD PRESSURE: 93 MMHG | WEIGHT: 121.6 LBS | RESPIRATION RATE: 16 BRPM

## 2023-11-13 DIAGNOSIS — K59.00 CONSTIPATION, UNSPECIFIED CONSTIPATION TYPE: ICD-10-CM

## 2023-11-13 DIAGNOSIS — B00.9 HERPES SIMPLEX VIRUS INFECTION: Primary | ICD-10-CM

## 2023-11-13 DIAGNOSIS — L98.9 SKIN LESION: ICD-10-CM

## 2023-11-13 PROCEDURE — 99213 OFFICE O/P EST LOW 20 MIN: CPT | Performed by: PHYSICIAN ASSISTANT

## 2023-11-13 RX ORDER — VALACYCLOVIR HYDROCHLORIDE 1 G/1
1000 TABLET, FILM COATED ORAL 2 TIMES DAILY
Qty: 60 TABLET | Refills: 0 | Status: SHIPPED | OUTPATIENT
Start: 2023-11-13

## 2023-11-13 RX ORDER — DOCUSATE SODIUM 100 MG/1
100 CAPSULE, LIQUID FILLED ORAL 2 TIMES DAILY PRN
Qty: 30 CAPSULE | Refills: 0 | Status: SHIPPED | OUTPATIENT
Start: 2023-11-13 | End: 2024-01-16

## 2023-11-13 NOTE — PROGRESS NOTES
"  Assessment & Plan     Herpes simplex virus infection  Patient declines exam. Refilled valtrex. Would recommend follow up with primary care provider for further refills.   - valACYclovir (VALTREX) 1000 mg tablet; Take 1 tablet (1,000 mg) by mouth 2 times daily    Skin lesion  Follow up with dermatology   - Adult Dermatology  Referral; Future    Constipation, unspecified constipation type    - docusate sodium (COLACE) 100 MG capsule; Take 1 capsule (100 mg) by mouth 2 times daily as needed for constipation                 Return in about 1 week (around 11/20/2023), or if symptoms worsen or fail to improve.    WILBER [facility]              Subjective   Chief Complaint   Patient presents with    STD     Pt says she can feel the lumps on her \"butt, I get this every couple months\", she only has 2 pills left of valacyclovir.       HPI     Med refill   Onset of symptoms was 1 day(s) ago.  Course of illness is worsening.    Severity moderate  Current and Associated symptoms: feels bumps starting around rectum, has history of herpes, would like refill  Treatment measures tried include None tried.  Predisposing factors include HIV .    Patient also concerned with forehead lesion. Reports primary care provider froze it and it did not go away.                 Review of Systems         Objective    BP 93/56   Pulse 102   Temp 97.8  F (36.6  C) (Tympanic)   Resp 16   Wt 55.2 kg (121 lb 9.6 oz)   SpO2 99%   BMI 20.87 kg/m    Body mass index is 20.87 kg/m .  Physical Exam  Constitutional:       General: She is not in acute distress.  Genitourinary:     Comments: Patient declined exam  Skin:     Comments: Small lesion on forehead. No signs of infection    Neurological:      Mental Status: She is alert.   Psychiatric:         Speech: Speech normal.         Behavior: Behavior normal.                              "

## 2023-12-28 ENCOUNTER — TELEPHONE (OUTPATIENT)
Dept: FAMILY MEDICINE | Facility: CLINIC | Age: 68
End: 2023-12-28
Payer: MEDICARE

## 2023-12-28 NOTE — TELEPHONE ENCOUNTER
Patient called in today requesting a call back for Dr Ji regarding the sore on her forehead.  I did schedule an appt with her for 1/16/24.  She states the bump is bleeding because she scratched it. Mentioned a dermatology referral possibly?

## 2024-01-05 NOTE — TELEPHONE ENCOUNTER
Agree with visit in clinic as being the best route both to possible skin biopsy and or possible dermatology rferral.

## 2024-01-16 ENCOUNTER — OFFICE VISIT (OUTPATIENT)
Dept: FAMILY MEDICINE | Facility: CLINIC | Age: 69
End: 2024-01-16
Payer: MEDICARE

## 2024-01-16 VITALS
TEMPERATURE: 98.1 F | BODY MASS INDEX: 21.26 KG/M2 | WEIGHT: 120 LBS | RESPIRATION RATE: 16 BRPM | HEIGHT: 63 IN | DIASTOLIC BLOOD PRESSURE: 64 MMHG | OXYGEN SATURATION: 99 % | SYSTOLIC BLOOD PRESSURE: 97 MMHG | HEART RATE: 86 BPM

## 2024-01-16 DIAGNOSIS — C44.91 NODULAR BASAL CELL CARCINOMA (BCC): ICD-10-CM

## 2024-01-16 DIAGNOSIS — D22.39 ATYPICAL NEVUS OF FOREHEAD: Primary | ICD-10-CM

## 2024-01-16 DIAGNOSIS — H10.31 ACUTE CONJUNCTIVITIS OF RIGHT EYE, UNSPECIFIED ACUTE CONJUNCTIVITIS TYPE: ICD-10-CM

## 2024-01-16 DIAGNOSIS — K59.00 CONSTIPATION, UNSPECIFIED CONSTIPATION TYPE: ICD-10-CM

## 2024-01-16 PROCEDURE — 11106 INCAL BX SKN SINGLE LES: CPT | Performed by: FAMILY MEDICINE

## 2024-01-16 PROCEDURE — 88305 TISSUE EXAM BY PATHOLOGIST: CPT | Performed by: DERMATOLOGY

## 2024-01-16 PROCEDURE — 99213 OFFICE O/P EST LOW 20 MIN: CPT | Mod: 25 | Performed by: FAMILY MEDICINE

## 2024-01-16 RX ORDER — POLYMYXIN B SULFATE AND TRIMETHOPRIM 1; 10000 MG/ML; [USP'U]/ML
1-2 SOLUTION OPHTHALMIC EVERY 6 HOURS
Qty: 10 ML | Refills: 0 | Status: SHIPPED | OUTPATIENT
Start: 2024-01-16 | End: 2024-01-21

## 2024-01-16 RX ORDER — DOCUSATE SODIUM 100 MG/1
100 CAPSULE, LIQUID FILLED ORAL 2 TIMES DAILY PRN
Qty: 30 CAPSULE | Refills: 0 | Status: SHIPPED | OUTPATIENT
Start: 2024-01-16

## 2024-01-16 NOTE — PROGRESS NOTES
Assessment & Plan     (D22.39) Atypical nevus of forehead  (primary encounter diagnosis)  Comment: possibly basal cell or benign seborrheic keratosis  Plan: Surgical Pathology Exam          SKIN BIOPSY PROCEDURE:  Skin Biopsy Procedure Note    Norma Sidhu is a patient of Mariela Jovel here for evaluation of ongoing lesion    Consent: Risks, benefits and alternatives were discussed. Patient's questions were elicited and answered.       Preoperative Diagnosis: atypical nevus, rule out basal cell carcinoma   Location: face just right of middle of forehead   Size:  3mm X 5 mm  Postoperative Diagnosis: same    Technique:   Skin prep alcohol  Anesthesia 0.5 cc 1% lidocaine, with epi   Biopsy size 5mm X 5 mm  Biopsy taken via (shave, punch, incisional) shave    Hemostasis  Direct pressure  Complications:  No  Tolerance:   Pt tolerated procedure well and was in stable condition.   Pathology sent Yes    Instructions:    Pt should keep dressing in place for 24 hours, then may change and apply antibiotic ointment and simple bandage. May shower after 24 hours.  Pt was instructed to call if bleeding, severe pain or foul smell.   Will call with results and plans pending biopsy        (H10.31) Acute conjunctivitis of right eye, unspecified acute conjunctivitis type  Comment: discussed could observe today, avoid touching eye and if purulent discharge start tomorrow  Plan: polymixin b-trimethoprim (POLYTRIM) 78348-1.1         UNIT/ML-% ophthalmic solution        Could apply in other eye if gets infected    (K59.00) Constipation, unspecified constipation type  Comment: refill  Plan: docusate sodium (COLACE) 100 MG capsule                         No follow-ups on file.    Mariela Ji MD  [facility]    Gianni Green is a 68 year old, presenting for the following health issues:  bump on fore head , Cough (For the last 10 days ), and Pink eye (Started last night )      " 1/16/2024     4:13 PM   Additional Questions   Roomed by olivia SOLOMON           Forehead lesion:  -year approximately, not ever going away, about 6 months ago was treated \"frozen\" and thought was pre-cancerous  -once scratched it and it bled  -hasn't changed in size and not otherwise irritating  _hx of melanoma on leg years ago    URI/red eye  Has had significant nasal congestion and coughing,   Had xray that was negative she reports and was told was bronchitis, possibly getting better but just today right eye was very red, some discomfort and some discharge  Not using contacts    Review of Systems     New HIV med and had some possible side effects       Objective    BP 97/64   Pulse 86   Temp 98.1  F (36.7  C)   Resp 16   Ht 1.6 m (5' 3\")   Wt 54.4 kg (120 lb)   SpO2 99%   BMI 21.26 kg/m    Body mass index is 21.26 kg/m .  Physical Exam   GENERAL: alert, no distress, and in normal dress with normal affect  EYES: PERRL and conjunctiva/corneas- right eye injected sclera with watery discharge, left eye normal sclera and no discharge  NECK: no adenopathy, no asymmetry, masses, or scars and thyroid normal to palpation  RESP: lungs clear to auscultation - no rales, rhonchi or wheezes  CV: regular rate and rhythm, normal S1 S2, no S3 or S4, no murmur, click or rub, no peripheral edema and peripheral pulses strong  SKIN: Single flesh colored raised oval lesion 3 mm X 5 mm on middle right forehead                      "

## 2024-01-19 LAB
PATH REPORT.COMMENTS IMP SPEC: ABNORMAL
PATH REPORT.COMMENTS IMP SPEC: ABNORMAL
PATH REPORT.COMMENTS IMP SPEC: YES
PATH REPORT.FINAL DX SPEC: ABNORMAL
PATH REPORT.GROSS SPEC: ABNORMAL
PATH REPORT.MICROSCOPIC SPEC OTHER STN: ABNORMAL
PATH REPORT.RELEVANT HX SPEC: ABNORMAL
PHOTO IMAGE: ABNORMAL

## 2024-01-22 NOTE — RESULT ENCOUNTER NOTE
I called patient and discussed her results. Patient will schedule her own appointment with Dermatology consultants.  Referral placed  Mariela Ji MD

## 2024-01-25 ENCOUNTER — TELEPHONE (OUTPATIENT)
Dept: DERMATOLOGY | Facility: CLINIC | Age: 69
End: 2024-01-25
Payer: MEDICARE

## 2024-01-25 NOTE — TELEPHONE ENCOUNTER
I called and spoke with Norma about her referral for Derm.     Norma is set to see dermatology on 02/14. She is unsure how this appt will be different than anything that her PCP could do. Pt recalls her MIL having a scraping procedure done and she asked why her PCP couldn't just do the scraping. Norma will ask about that on 01/30.     Karissa Moctezuma, Procedure  1/25/2024 8:37 AM

## 2024-01-30 ENCOUNTER — OFFICE VISIT (OUTPATIENT)
Dept: FAMILY MEDICINE | Facility: CLINIC | Age: 69
End: 2024-01-30
Payer: MEDICARE

## 2024-01-30 VITALS
DIASTOLIC BLOOD PRESSURE: 59 MMHG | TEMPERATURE: 99 F | SYSTOLIC BLOOD PRESSURE: 93 MMHG | OXYGEN SATURATION: 99 % | WEIGHT: 121 LBS | RESPIRATION RATE: 18 BRPM | BODY MASS INDEX: 21.44 KG/M2 | HEIGHT: 63 IN | HEART RATE: 88 BPM

## 2024-01-30 DIAGNOSIS — Z12.4 ENCOUNTER FOR PAPANICOLAOU SMEAR OF CERVIX IN HIGH-RISK PATIENT WITH NO PRIOR ABNORMAL RESULT: Primary | ICD-10-CM

## 2024-01-30 DIAGNOSIS — Z91.89 ENCOUNTER FOR PAPANICOLAOU SMEAR OF CERVIX IN HIGH-RISK PATIENT WITH NO PRIOR ABNORMAL RESULT: Primary | ICD-10-CM

## 2024-01-30 DIAGNOSIS — Z21 HIV INFECTION, UNSPECIFIED SYMPTOM STATUS (H): ICD-10-CM

## 2024-01-30 DIAGNOSIS — C44.91 NODULAR BASAL CELL CARCINOMA: ICD-10-CM

## 2024-01-30 DIAGNOSIS — Z85.820 HX OF MELANOMA EXCISION: ICD-10-CM

## 2024-01-30 DIAGNOSIS — Z98.890 HX OF MELANOMA EXCISION: ICD-10-CM

## 2024-01-30 DIAGNOSIS — F20.3 UNDIFFERENTIATED SCHIZOPHRENIA (H): ICD-10-CM

## 2024-01-30 PROBLEM — C43.70 MALIGNANT MELANOMA OF SKIN OF LOWER LIMB, INCLUDING HIP (H): Status: RESOLVED | Noted: 2021-06-30 | Resolved: 2024-01-30

## 2024-01-30 PROCEDURE — G0145 SCR C/V CYTO,THINLAYER,RESCR: HCPCS | Performed by: FAMILY MEDICINE

## 2024-01-30 PROCEDURE — 99214 OFFICE O/P EST MOD 30 MIN: CPT | Performed by: FAMILY MEDICINE

## 2024-01-30 PROCEDURE — 87624 HPV HI-RISK TYP POOLED RSLT: CPT | Performed by: FAMILY MEDICINE

## 2024-01-30 NOTE — PROGRESS NOTES
"  Assessment & Plan     (Z12.4,  Z91.89) Encounter for Papanicolaou smear of cervix in high-risk patient with no prior abnormal result  (primary encounter diagnosis)  Comment: Obtain Pap smear  Plan: Gynecologic Cytology (PAP)        Will notify of results and treat appropriately    (C44.91) Nodular basal cell carcinoma  Comment: Appears to be well-healing  Plan: Keep appointment with dermatology    (Z98.890,  Z85.820) Hx of melanoma excision  Comment: Patient to wear of previous history and basal cell different from melanoma  Plan: Patient has been to dermatology consistently except for the last several years encouraged to reestablish    (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  Her schizophrenia and sometimes paranoid thoughts can interfere with her compliance with HIV medications                No follow-ups on file.    Gianni Green is a 68 year old, presenting for the following health issues:  Gyn Exam and forehead (Recheck skin./ bump on forehead )      1/30/2024     8:54 AM   Additional Questions   Roomed by olivia SOLOMON     Due for screening pap smear, yearly with HIV positive status  Denies new partners  Denies vaginal symptoms    2.  Nodular basal cell forehead  -Has a dermatology appointment feels like it is healing very well not sure she wants further surgical intervention but still planning on seeing the dermatologist                  Objective    BP 93/59   Pulse 88   Temp 99  F (37.2  C) (Oral)   Resp 18   Ht 1.6 m (5' 3\")   Wt 54.9 kg (121 lb)   SpO2 99%   BMI 21.43 kg/m    Body mass index is 21.43 kg/m .  Physical Exam   Gen: alert, oriented X 3, no acute distress, no sign of " discomfort  Forehead: Well-healing small 3 mm biopsy site above middle forehead above right eye  Pelvic: normal external genitalia,  Normal cervix/vagina with vaginal atrophy  Pap obtained Yes,              Signed Electronically by: Mariela Ji MD

## 2024-02-02 LAB
BKR LAB AP GYN ADEQUACY: NORMAL
BKR LAB AP GYN INTERPRETATION: NORMAL
BKR LAB AP HPV REFLEX: NORMAL
BKR LAB AP LMP: NORMAL
BKR LAB AP PREVIOUS ABNORMAL: NORMAL
PATH REPORT.COMMENTS IMP SPEC: NORMAL
PATH REPORT.COMMENTS IMP SPEC: NORMAL
PATH REPORT.RELEVANT HX SPEC: NORMAL

## 2024-02-09 ENCOUNTER — PATIENT OUTREACH (OUTPATIENT)
Dept: FAMILY MEDICINE | Facility: CLINIC | Age: 69
End: 2024-02-09
Payer: MEDICARE

## 2024-02-09 NOTE — TELEPHONE ENCOUNTER
Patient Quality Outreach    Patient is due for the following:   Colon Cancer Screening  Breast Cancer Screening - Mammogram    Next Steps:   Patient declined follow up at this time.    Type of outreach:    Phone, spoke to patient/parent. Pt is going on vacation and stated she'll make an appointment when she comes back      Questions for provider review:               Anamika Gibson

## 2024-02-21 ENCOUNTER — HOSPITAL ENCOUNTER (EMERGENCY)
Facility: CLINIC | Age: 69
Discharge: HOME | End: 2024-02-21
Attending: PHYSICIAN ASSISTANT | Admitting: PHYSICIAN ASSISTANT
Payer: MEDICARE

## 2024-02-21 VITALS
TEMPERATURE: 98.6 F | RESPIRATION RATE: 22 BRPM | HEART RATE: 81 BPM | OXYGEN SATURATION: 97 % | DIASTOLIC BLOOD PRESSURE: 56 MMHG | SYSTOLIC BLOOD PRESSURE: 93 MMHG

## 2024-02-21 DIAGNOSIS — Z20.2 STD EXPOSURE: ICD-10-CM

## 2024-02-21 DIAGNOSIS — N76.0 VAGINITIS: ICD-10-CM

## 2024-02-21 LAB
CLUE CELLS: NORMAL
TRICHOMONAS, WET PREP: NORMAL
WBC'S/HIGH POWER FIELD, WET PREP: NORMAL
YEAST, WET PREP: NORMAL

## 2024-02-21 PROCEDURE — 99213 OFFICE O/P EST LOW 20 MIN: CPT | Performed by: PHYSICIAN ASSISTANT

## 2024-02-21 PROCEDURE — 87591 N.GONORRHOEAE DNA AMP PROB: CPT | Performed by: PHYSICIAN ASSISTANT

## 2024-02-21 PROCEDURE — 87210 SMEAR WET MOUNT SALINE/INK: CPT | Performed by: PHYSICIAN ASSISTANT

## 2024-02-21 PROCEDURE — G0463 HOSPITAL OUTPT CLINIC VISIT: HCPCS | Performed by: PHYSICIAN ASSISTANT

## 2024-02-21 PROCEDURE — 87491 CHLMYD TRACH DNA AMP PROBE: CPT | Performed by: PHYSICIAN ASSISTANT

## 2024-02-21 ASSESSMENT — ENCOUNTER SYMPTOMS
GASTROINTESTINAL NEGATIVE: 1
CONSTITUTIONAL NEGATIVE: 1
DYSURIA: 0
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
## 2024-02-21 NOTE — RESULT ENCOUNTER NOTE
I spoke with the Patient via telephone call and communicated these results. Discussed yearly pap needed.    Patient had visit back to [place] with previous boyfriend, had intercourse and now back in [place] and having vaginal itching.  Did take amoxicillin able to purchase there.  Advised to go to get screened for STI germs.  Patient agrees.    Mariela Ji MD

## 2024-02-22 ENCOUNTER — PATIENT OUTREACH (OUTPATIENT)
Dept: CARE COORDINATION | Facility: CLINIC | Age: 69
End: 2024-02-22
Payer: MEDICARE

## 2024-02-22 LAB
C TRACH DNA SPEC QL NAA+PROBE: NEGATIVE
N GONORRHOEA DNA SPEC QL NAA+PROBE: NEGATIVE

## 2024-02-22 NOTE — PROGRESS NOTES
Clinic Care Coordination Contact  Follow Up Progress Note      Assessment: The pt was recently in the ED, I called to check up on the pt and help the pt setup a ED follow up. The pt was at [place] for vaginitis. I called and talked to the pt, pt stated that she is doing better. She stated that they did not find anything, she does not need a follow up.     Care Gaps:    Health Maintenance Due   Topic Date Due    ADVANCE CARE PLANNING  Never done    MENINGITIS IMMUNIZATION (1 - Risk 2-dose series) Never done    Pneumococcal Vaccine: 65+ Years (1 of 2 - PCV) Never done    HEPATITIS C SCREENING  Never done    HEPATITIS A IMMUNIZATION (1 of 2 - Risk 2-dose series) Never done    RSV VACCINE (Pregnancy & 60+) (1 - 1-dose 60+ series) Never done    ZOSTER IMMUNIZATION (1 of 2) 01/07/2017    COLORECTAL CANCER SCREENING  03/31/2017    LIPID  02/12/2020    DTAP/TDAP/TD IMMUNIZATION (3 - Td or Tdap) 02/08/2021    MEDICARE ANNUAL WELLNESS VISIT  11/08/2021    FALL RISK ASSESSMENT  Never done    DEXA  11/15/2021    HEPATITIS B IMMUNIZATION (2 of 3 - Risk 3-dose series) 08/24/2022    MAMMO SCREENING  11/25/2022    INFLUENZA VACCINE (1) 09/01/2023    COVID-19 Vaccine (2 - 2023-24 season) 09/01/2023    PHQ-2 (once per calendar year)  01/01/2024           Care Plans      Intervention/Education provided during outreach:               Plan:     Care Coordinator will follow up in

## 2024-04-04 ENCOUNTER — PATIENT OUTREACH (OUTPATIENT)
Dept: FAMILY MEDICINE | Facility: CLINIC | Age: 69
End: 2024-04-04
Payer: MEDICARE

## 2024-04-04 NOTE — TELEPHONE ENCOUNTER
"Patient Quality Outreach    Patient is due for the following:   Breast Cancer Screening - Mammogram    Next Steps:   No follow up needed at this time.    Type of outreach:    Phone, spoke to patient/parent. Pt declined and said \" I live in [place] and I will just see my doctor here\"      Venita Trent, [facility]  Chart routed to Provider.      "

## 2024-04-24 ENCOUNTER — TELEPHONE (OUTPATIENT)
Dept: FAMILY MEDICINE | Facility: CLINIC | Age: 69
End: 2024-04-24
Payer: MEDICARE

## 2024-04-24 DIAGNOSIS — C44.91 NODULAR BASAL CELL CARCINOMA: Primary | ICD-10-CM

## 2024-04-24 NOTE — TELEPHONE ENCOUNTER
Patient called in stating she wants Dr Ji to send referral to a different clinic-- [facility] in [place] [phone]

## 2024-06-05 ENCOUNTER — TELEPHONE (OUTPATIENT)
Dept: FAMILY MEDICINE | Facility: CLINIC | Age: 69
End: 2024-06-05

## 2024-06-05 NOTE — TELEPHONE ENCOUNTER
Patient called requesting if PCP can get results from Dr. Guzman infectious disease regarding her HIV. She stated she cannot get through to anyone from Dr. Guzman office. Please further advise thank you.

## 2024-06-06 NOTE — TELEPHONE ENCOUNTER
I am able to see the labs from April.  I also see there was an attempt to reach you by phone yesterday from Dr. Guzman's office      Your HIV is the same as 2023,   viral load <20 and CD4 is 943, these tests show the medications area working

## 2024-06-18 ENCOUNTER — TELEPHONE (OUTPATIENT)
Dept: DERMATOLOGY | Facility: CLINIC | Age: 69
End: 2024-06-18
Payer: MEDICARE

## 2024-06-18 NOTE — TELEPHONE ENCOUNTER
Pt called in about an old referral for Mohs on a BCC on her forehead. Pt would like to see Dr. Parisi again for Mohs but was unclear which location she wants to go to.     Pt declined me sending a message to either or Dr. Parisi's teams.     Norma took down our telephone number so that she can call in for scheduling.     Karissa Moctezuma, Complex  6/18/2024 1:32 PM

## 2024-06-19 ENCOUNTER — TELEPHONE (OUTPATIENT)
Dept: DERMATOLOGY | Facility: CLINIC | Age: 69
End: 2024-06-19
Payer: MEDICARE

## 2024-06-19 NOTE — LETTER
June 19, 2024      Norma Sidhu  [id number] [address] [id number]        Dear Norma,       You are scheduled for Mohs Surgery on  7/17/2024 at 7:45 AM  We are located at the [facility] in [place]. Please check in at the Dermatology Clinic located on the 2nd floor at the end of the gary.    You don't need to arrive more than 5-10 minutes prior to your appointment time.    Be sure to eat a good breakfast and bathe and wash your hair prior to Surgery.   If you are taking any anti-coagulants that are prescribed by your Doctor (such as Coumadin/warfarin, Plavix, Aspirin, Ibuprofen), please continue taking them.    However, If you are taking anti-coagulants over the counter without a Doctor's order for a Medical condition, please discontinue them 10 days prior to Surgery.      Please wear comfortable clothing as you could possibly be in the clinic for 4-6 hours for your surgery.           Sincerely,        Michael Parisi MD

## 2024-06-19 NOTE — TELEPHONE ENCOUNTER
Pt Walked into clinic, Requesting to schedule Mohs with Michael Parisi M.D.  Reviewed Chart - Pathology + for BCC of forehead  Biopsy done by Mariela Ji MD   Pathology results place in [place] [facility] biopsy folder  Appointment scheduled for 7/17/2024 @ 7:45 AM  Referrals Attached.  Letter Sent. With packet + BCC pamphlet.

## 2024-07-09 ENCOUNTER — TELEPHONE (OUTPATIENT)
Dept: FAMILY MEDICINE | Facility: CLINIC | Age: 69
End: 2024-07-09
Payer: MEDICARE

## 2024-07-09 NOTE — TELEPHONE ENCOUNTER
Patient called in to let you know that she is having a MOHS appt for the cancer on her face -- at [facility] on July 17    Wanted you to know they have changed her HIV medication and she starts that this week     Thank you

## 2024-07-15 ENCOUNTER — TELEPHONE (OUTPATIENT)
Dept: DERMATOLOGY | Facility: CLINIC | Age: 69
End: 2024-07-15
Payer: MEDICARE

## 2024-07-15 NOTE — TELEPHONE ENCOUNTER
[facility] Call Center    Phone Message    May a detailed message be left on voicemail: yes     Reason for Call: per pt - pt said she no longer needs he mohs Appt. (this was froze off). Pt canceled Appt for 07/17/24 and does not want to reschedule as she said she no longer wants it. Please review. Thanks pt asked to just schedule for a mole check only. Pt is scheduled for a mole check. Thanks     Action Taken: Message routed to:  Other: ALETHA boone    Travel Screening: Not Applicable     Date of Service:

## 2024-07-16 NOTE — TELEPHONE ENCOUNTER
Looks like patient was referred to a different Dermatology clinic per chart review. Gisela Noel RN

## 2024-08-19 ENCOUNTER — ANCILLARY PROCEDURE (OUTPATIENT)
Dept: MAMMOGRAPHY | Facility: CLINIC | Age: 69
End: 2024-08-19
Payer: MEDICARE

## 2024-08-19 DIAGNOSIS — Z12.31 VISIT FOR SCREENING MAMMOGRAM: ICD-10-CM

## 2024-08-19 PROCEDURE — 77067 SCR MAMMO BI INCL CAD: CPT | Mod: TC | Performed by: RADIOLOGY

## 2024-08-19 PROCEDURE — 77063 BREAST TOMOSYNTHESIS BI: CPT | Mod: TC | Performed by: RADIOLOGY

## 2024-08-22 ENCOUNTER — TELEPHONE (OUTPATIENT)
Dept: FAMILY MEDICINE | Facility: CLINIC | Age: 69
End: 2024-08-22
Payer: MEDICARE

## 2024-08-22 DIAGNOSIS — Z12.11 SCREENING FOR COLON CANCER: Primary | ICD-10-CM
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
## 2024-08-27 ENCOUNTER — TELEPHONE (OUTPATIENT)
Dept: FAMILY MEDICINE | Facility: CLINIC | Age: 69
End: 2024-08-27
Payer: MEDICARE
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
## 2024-08-27 NOTE — TELEPHONE ENCOUNTER
Left message for patient to call me back to help her with scheduling a colon cancer screen. Per our records patient has never had a colonoscopy, no family history of colon cancer meets age requirements for possible cologuard.Will wait to hear from patient before ordering anything.

## 2024-08-29 ENCOUNTER — ORDERS ONLY (AUTO-RELEASED) (OUTPATIENT)
Dept: FAMILY MEDICINE | Facility: CLINIC | Age: 69
End: 2024-08-29
Payer: MEDICARE

## 2024-08-29 DIAGNOSIS — Z12.11 SCREENING FOR COLON CANCER: ICD-10-CM

## 2024-08-29 PROBLEM — Z21 POSITIVE LABORATORY TESTING FOR HUMAN IMMUNODEFICIENCY VIRUS (H): Status: ACTIVE | Noted: 2024-08-07

## 2024-08-29 RX ORDER — SIMETHICONE 125 MG
125-250 TABLET,CHEWABLE ORAL 4 TIMES DAILY PRN
COMMUNITY

## 2024-08-29 RX ORDER — DARUNAVIR, COBICISTAT, EMTRICITABINE, AND TENOFOVIR ALAFENAMIDE 800; 150; 200; 10 MG/1; MG/1; MG/1; MG/1
1 TABLET, FILM COATED ORAL
COMMUNITY
Start: 2024-07-02

## 2024-08-29 NOTE — TELEPHONE ENCOUNTER
Called.  Relieved and has information    Has been constipated, possibly from HIV meds and has had that changed.    Stopped triunix but too much gas and constipation    Switched to symtuza now.    Ex  has informed her that he was always HIV negative and patient brought up concerns about how she became HIV positive.    Some paranoia concerns.

## 2024-09-11 LAB — NONINV COLON CA DNA+OCC BLD SCRN STL QL: NEGATIVE

## 2024-10-03 ENCOUNTER — TELEPHONE (OUTPATIENT)
Dept: FAMILY MEDICINE | Facility: CLINIC | Age: 69
End: 2024-10-03
Payer: MEDICARE

## 2024-10-03 NOTE — TELEPHONE ENCOUNTER
Patient called requesting a referral for [facility]- Dr Chaz Turcios for Early Onset Alzheimer's.  Their phone number is [phone]-- she did not have a fax for them unfortunately.  Please send if able -- thank you

## 2024-10-09 NOTE — TELEPHONE ENCOUNTER
Called stated referral to [facility] is more complicated as out of the system and requires some more detail.  Also referral would need to have an office visit with me for more information about concerns and a brief neurologic exam is needed.    Please call to set up either video visit or in person visit with me at [facility].

## 2024-12-04 ENCOUNTER — TELEPHONE (OUTPATIENT)
Dept: FAMILY MEDICINE | Facility: CLINIC | Age: 69
End: 2024-12-04
Payer: MEDICARE

## 2024-12-09 NOTE — TELEPHONE ENCOUNTER
Left message that patient needs to reach out to Dr. Guzman's office, infectious disease to discuss switching to injection from the pills to control HIV.      We do not stock the injection medication.  Mariela Ji MD    
New Medication Request        What medication are you requesting?: HIV injection medication    Reason for medication request: Patient is asking if PCP can see if she can do HIV injection medication once a month instead of taking her symtuza tabs. Would like a call back regarding this. Please advise further.    Have you taken this medication before?: No    Controlled Substance Agreement on file:   CSA -- Patient Level:    CSA: None found at the patient level.         Patient offered an appointment? Yes: I offered patient declined. Requested a message to just be sent.     Preferred Pharmacy:     [id number] - [place] [id number] [facility] [address] [facility] [address]  [address] [address]  Phone: [phone] Fax: [phone]        Okay to leave a detailed message?: Yes at Cell number on file:    Telephone Information:   Mobile [phone]      
 used

## 2024-12-23 ENCOUNTER — TELEPHONE (OUTPATIENT)
Dept: FAMILY MEDICINE | Facility: CLINIC | Age: 69
End: 2024-12-23
Payer: MEDICARE

## 2024-12-23 DIAGNOSIS — Z85.820 HX OF MELANOMA EXCISION: Primary | ICD-10-CM

## 2024-12-23 DIAGNOSIS — Z98.890 HX OF MELANOMA EXCISION: Primary | ICD-10-CM

## 2025-04-03 ENCOUNTER — DOCUMENTATION ONLY (OUTPATIENT)
Dept: CARE COORDINATION | Facility: CLINIC | Age: 70
End: 2025-04-03
Payer: MEDICARE

## 2025-04-03 DIAGNOSIS — Z59.71 INSUFFICIENT HEALTH INSURANCE COVERAGE: Primary | ICD-10-CM

## 2025-04-03 NOTE — CONFIDENTIAL NOTE
Patient brought in bill for 3/14/25 appointment at [facility] that indicates charges not sufficiently covered by health insurance. Patient in need of financial assistance application. Patient reported having appealed insurance denial for uncovered visit.    AMARJIT MONK LGSW, LADC